# Patient Record
Sex: MALE | Race: WHITE | NOT HISPANIC OR LATINO | Employment: FULL TIME | URBAN - METROPOLITAN AREA
[De-identification: names, ages, dates, MRNs, and addresses within clinical notes are randomized per-mention and may not be internally consistent; named-entity substitution may affect disease eponyms.]

---

## 2017-10-24 ENCOUNTER — CLINICAL SUPPORT (OUTPATIENT)
Dept: OCCUPATIONAL MEDICINE | Facility: CLINIC | Age: 42
End: 2017-10-24

## 2017-10-24 DIAGNOSIS — Z00.00 PHYSICAL EXAM: Primary | ICD-10-CM

## 2017-10-24 LAB
BILIRUB UR QL STRIP: NORMAL
GLUCOSE UR QL STRIP: NORMAL
KETONES UR QL STRIP: NORMAL
LEUKOCYTE ESTERASE UR QL STRIP: NORMAL
PH, POC UA: NORMAL (ref 5–8)
POC BLOOD, URINE: NORMAL
POC NITRATES, URINE: NORMAL
PROT UR QL STRIP: NORMAL
SP GR UR STRIP: NORMAL (ref 1–1.03)
UROBILINOGEN UR STRIP-ACNC: NORMAL (ref 0.3–2.2)

## 2017-10-24 PROCEDURE — 97750 PHYSICAL PERFORMANCE TEST: CPT | Mod: S$GLB,,, | Performed by: PREVENTIVE MEDICINE

## 2017-10-24 PROCEDURE — 94010 BREATHING CAPACITY TEST: CPT | Mod: S$GLB,,, | Performed by: PREVENTIVE MEDICINE

## 2017-10-24 PROCEDURE — 99002 DEVICE HANDLING PHYS/QHP: CPT | Mod: S$GLB,,, | Performed by: PREVENTIVE MEDICINE

## 2017-10-24 PROCEDURE — 99499 UNLISTED E&M SERVICE: CPT | Mod: S$GLB,,, | Performed by: PREVENTIVE MEDICINE

## 2017-10-24 PROCEDURE — 89240 UNLISTED MISC PATH TEST: CPT | Mod: S$GLB,,, | Performed by: PREVENTIVE MEDICINE

## 2017-10-24 PROCEDURE — 99080 SPECIAL REPORTS OR FORMS: CPT | Mod: S$GLB,,, | Performed by: PREVENTIVE MEDICINE

## 2017-10-24 PROCEDURE — 95832 PR HAND MUSCLE TEST,MANUAL: CPT | Mod: S$GLB,,, | Performed by: PREVENTIVE MEDICINE

## 2018-05-15 ENCOUNTER — NURSE TRIAGE (OUTPATIENT)
Dept: ADMINISTRATIVE | Facility: CLINIC | Age: 43
End: 2018-05-15

## 2018-05-15 PROBLEM — F17.210 CIGARETTE SMOKER: Status: ACTIVE | Noted: 2018-05-15

## 2018-05-15 PROBLEM — E78.00 HYPERCHOLESTEROLEMIA: Status: ACTIVE | Noted: 2018-05-15

## 2018-05-15 NOTE — TELEPHONE ENCOUNTER
Reason for Disposition   Severe headache, states 'worst headache' of life    Protocols used: ST HEADACHE-A-OH    EC states 3-4 days ago he started with generalized body aches and sweats. Now he has a severe headache that goes to neck. Care advice given.

## 2018-10-26 ENCOUNTER — OCCUPATIONAL HEALTH (OUTPATIENT)
Dept: URGENT CARE | Facility: CLINIC | Age: 43
End: 2018-10-26
Payer: COMMERCIAL

## 2018-10-26 DIAGNOSIS — Z00.00 ENCOUNTER FOR PHYSICAL EXAMINATION: ICD-10-CM

## 2018-10-26 PROCEDURE — 94010 BREATHING CAPACITY TEST: CPT | Mod: S$GLB,,, | Performed by: PHYSICIAN ASSISTANT

## 2018-10-26 PROCEDURE — 99000 SPECIMEN HANDLING OFFICE-LAB: CPT | Mod: S$GLB,,, | Performed by: PHYSICIAN ASSISTANT

## 2018-10-26 PROCEDURE — 99080 SPECIAL REPORTS OR FORMS: CPT | Mod: S$GLB,,, | Performed by: PHYSICIAN ASSISTANT

## 2018-10-26 PROCEDURE — 36415 COLL VENOUS BLD VENIPUNCTURE: CPT | Mod: S$GLB,,, | Performed by: PHYSICIAN ASSISTANT

## 2018-10-26 PROCEDURE — 99002 DEVICE HANDLING PHYS/QHP: CPT | Mod: S$GLB,,, | Performed by: PHYSICIAN ASSISTANT

## 2018-10-26 PROCEDURE — 89240 UNLISTED MISC PATH TEST: CPT | Mod: S$GLB,,, | Performed by: PHYSICIAN ASSISTANT

## 2018-10-27 LAB
ALBUMIN SERPL-MCNC: 4.8 G/DL (ref 3.5–5.5)
ALBUMIN/GLOB SERPL: 1.8 {RATIO} (ref 1.2–2.2)
ALP SERPL-CCNC: 78 IU/L (ref 39–117)
ALT SERPL-CCNC: 18 IU/L (ref 0–44)
APPEARANCE UR: CLEAR
AST SERPL-CCNC: 24 IU/L (ref 0–40)
BASOPHILS # BLD AUTO: 0 X10E3/UL (ref 0–0.2)
BASOPHILS NFR BLD AUTO: 1 %
BILIRUB SERPL-MCNC: 0.3 MG/DL (ref 0–1.2)
BILIRUB UR QL STRIP: NEGATIVE
BUN SERPL-MCNC: 14 MG/DL (ref 6–24)
BUN/CREAT SERPL: 15 (ref 9–20)
CALCIUM SERPL-MCNC: 10 MG/DL (ref 8.7–10.2)
CHLORIDE SERPL-SCNC: 100 MMOL/L (ref 96–106)
CHOLEST SERPL-MCNC: 221 MG/DL (ref 100–199)
CO2 SERPL-SCNC: 22 MMOL/L (ref 20–29)
COLOR UR: YELLOW
CREAT SERPL-MCNC: 0.94 MG/DL (ref 0.76–1.27)
EGFR IF AFRICAN AMERICAN: 115 ML/MIN/1.73
EOSINOPHIL # BLD AUTO: 0.1 X10E3/UL (ref 0–0.4)
EOSINOPHIL NFR BLD AUTO: 1 %
ERYTHROCYTE [DISTWIDTH] IN BLOOD BY AUTOMATED COUNT: 12.7 % (ref 12.3–15.4)
EST. GFR  (NON AFRICAN AMERICAN): 100 ML/MIN/1.73
GGT SERPL-CCNC: 41 IU/L (ref 0–65)
GLOBULIN SER CALC-MCNC: 2.6 G/DL (ref 1.5–4.5)
GLUCOSE SERPL-MCNC: 101 MG/DL (ref 65–99)
GLUCOSE UR QL: NEGATIVE
HCT VFR BLD AUTO: 46.3 % (ref 37.5–51)
HDLC SERPL-MCNC: 62 MG/DL
HGB BLD-MCNC: 16 G/DL (ref 13–17.7)
HGB UR QL STRIP: NEGATIVE
IMM GRANULOCYTES # BLD: 0 X10E3/UL (ref 0–0.1)
IMM GRANULOCYTES NFR BLD: 0 %
KETONES UR QL STRIP: NEGATIVE
LDLC SERPL CALC-MCNC: 143 MG/DL (ref 0–99)
LEUKOCYTE ESTERASE UR QL STRIP: NEGATIVE
LYMPHOCYTES # BLD AUTO: 2 X10E3/UL (ref 0.7–3.1)
LYMPHOCYTES NFR BLD AUTO: 24 %
MCH RBC QN AUTO: 31.3 PG (ref 26.6–33)
MCHC RBC AUTO-ENTMCNC: 34.6 G/DL (ref 31.5–35.7)
MCV RBC AUTO: 90 FL (ref 79–97)
MICRO URNS: NORMAL
MONOCYTES # BLD AUTO: 0.4 X10E3/UL (ref 0.1–0.9)
MONOCYTES NFR BLD AUTO: 5 %
NEUTROPHILS # BLD AUTO: 5.8 X10E3/UL (ref 1.4–7)
NEUTROPHILS NFR BLD AUTO: 69 %
NITRITE UR QL STRIP: NEGATIVE
PH UR STRIP: 6 [PH] (ref 5–7.5)
PLATELET # BLD AUTO: 254 X10E3/UL (ref 150–379)
POTASSIUM SERPL-SCNC: 4.4 MMOL/L (ref 3.5–5.2)
PROT SERPL-MCNC: 7.4 G/DL (ref 6–8.5)
PROT UR QL STRIP: NEGATIVE
RBC # BLD AUTO: 5.12 X10E6/UL (ref 4.14–5.8)
SODIUM SERPL-SCNC: 140 MMOL/L (ref 134–144)
SP GR UR: 1.01 (ref 1–1.03)
TRIGL SERPL-MCNC: 80 MG/DL (ref 0–149)
UROBILINOGEN UR STRIP-MCNC: 0.2 MG/DL (ref 0.2–1)
VLDLC SERPL CALC-MCNC: 16 MG/DL (ref 5–40)
WBC # BLD AUTO: 8.3 X10E3/UL (ref 3.4–10.8)

## 2018-12-10 ENCOUNTER — OFFICE VISIT (OUTPATIENT)
Dept: PRIMARY CARE CLINIC | Facility: CLINIC | Age: 43
End: 2018-12-10
Payer: COMMERCIAL

## 2018-12-10 VITALS
DIASTOLIC BLOOD PRESSURE: 81 MMHG | SYSTOLIC BLOOD PRESSURE: 137 MMHG | RESPIRATION RATE: 18 BRPM | HEIGHT: 66 IN | OXYGEN SATURATION: 95 % | HEART RATE: 60 BPM | TEMPERATURE: 98 F | WEIGHT: 145.19 LBS | BODY MASS INDEX: 23.33 KG/M2

## 2018-12-10 DIAGNOSIS — E78.00 HYPERCHOLESTEROLEMIA: ICD-10-CM

## 2018-12-10 DIAGNOSIS — S60.452A FOREIGN BODY OF RIGHT MIDDLE FINGER: Primary | ICD-10-CM

## 2018-12-10 DIAGNOSIS — F17.210 CIGARETTE SMOKER: ICD-10-CM

## 2018-12-10 DIAGNOSIS — R79.89 ABNORMAL LIVER FUNCTION TESTS: ICD-10-CM

## 2018-12-10 PROCEDURE — 3008F BODY MASS INDEX DOCD: CPT | Mod: CPTII,S$GLB,, | Performed by: FAMILY MEDICINE

## 2018-12-10 PROCEDURE — 90471 IMMUNIZATION ADMIN: CPT | Mod: S$GLB,,, | Performed by: FAMILY MEDICINE

## 2018-12-10 PROCEDURE — 99999 PR PBB SHADOW E&M-EST. PATIENT-LVL III: CPT | Mod: PBBFAC,,, | Performed by: FAMILY MEDICINE

## 2018-12-10 PROCEDURE — 90714 TD VACC NO PRESV 7 YRS+ IM: CPT | Mod: S$GLB,,, | Performed by: FAMILY MEDICINE

## 2018-12-10 PROCEDURE — 99213 OFFICE O/P EST LOW 20 MIN: CPT | Mod: 25,S$GLB,, | Performed by: FAMILY MEDICINE

## 2018-12-10 RX ORDER — HYDROCODONE BITARTRATE AND ACETAMINOPHEN 5; 325 MG/1; MG/1
1 TABLET ORAL EVERY 6 HOURS PRN
Qty: 30 TABLET | Refills: 0 | Status: SHIPPED | OUTPATIENT
Start: 2018-12-10 | End: 2019-01-02

## 2018-12-10 RX ORDER — DOXYCYCLINE 100 MG/1
100 CAPSULE ORAL EVERY 12 HOURS
Qty: 20 CAPSULE | Refills: 0 | Status: SHIPPED | OUTPATIENT
Start: 2018-12-10 | End: 2018-12-20

## 2018-12-10 RX ORDER — IBUPROFEN 600 MG/1
600 TABLET ORAL EVERY 6 HOURS PRN
Qty: 100 TABLET | Refills: 5 | Status: SHIPPED | OUTPATIENT
Start: 2018-12-10 | End: 2018-12-17 | Stop reason: SDUPTHER

## 2018-12-11 ENCOUNTER — OFFICE VISIT (OUTPATIENT)
Dept: SURGERY | Facility: CLINIC | Age: 43
End: 2018-12-11
Payer: COMMERCIAL

## 2018-12-11 VITALS
HEIGHT: 66 IN | HEART RATE: 66 BPM | DIASTOLIC BLOOD PRESSURE: 81 MMHG | WEIGHT: 145.19 LBS | SYSTOLIC BLOOD PRESSURE: 142 MMHG | BODY MASS INDEX: 23.33 KG/M2

## 2018-12-11 DIAGNOSIS — F17.200 SMOKER: Primary | ICD-10-CM

## 2018-12-11 PROCEDURE — 99203 OFFICE O/P NEW LOW 30 MIN: CPT | Mod: S$GLB,,, | Performed by: SURGERY

## 2018-12-11 PROCEDURE — 99999 PR PBB SHADOW E&M-EST. PATIENT-LVL III: CPT | Mod: PBBFAC,,, | Performed by: SURGERY

## 2018-12-11 PROCEDURE — 3008F BODY MASS INDEX DOCD: CPT | Mod: CPTII,S$GLB,, | Performed by: SURGERY

## 2018-12-11 NOTE — PROGRESS NOTES
Patient verified by name and . Allerigies reviewed. Td vaccine given im to left deltoid using aseptic technique. Patient tolerated well

## 2018-12-11 NOTE — LETTER
December 11, 2018      Eddie San MD  [address] [place] [address]           [facility]  [address], [facility] [address]  [place] [address]  Phone: [phone]  Fax: [phone]          Patient: Huber Burciaga   MR Number: [id number]   YOB: 1975   Date of Visit: 12/11/2018       Dear Dr. Eddie San:    Thank you for referring Huber Burciaga to me for evaluation. Attached you will find relevant portions of my assessment and plan of care.    If you have questions, please do not hesitate to call me. I look forward to following Huber Burciaga along with you.    Sincerely,    Shahid Romo MD    Enclosure  CC:  No Recipients    If you would like to receive this communication electronically, please contact [email or web address] or [phone] to request more information on [employer] Link access.    For providers and/or their staff who would like to refer a patient to [facility], please contact us through our one-stop-shop provider referral line, [facility], at [phone].    If you feel you have received this communication in error or would no longer like to receive these types of communications, please e-mail [email or web address]

## 2018-12-11 NOTE — PROGRESS NOTES
Subjective:       Patient ID: Huber Burciaga is a 42 y.o. male.    Chief Complaint: infected finger     HPI:  42-year-old white male in for infected right 3rd digit--stuck by a shrimp 2 weeks ago--patient states tried use drawing salve--too painful to trying to grab anything.  Gets shooting pain burning pain and all fingers.  Patient not sure when had last tetanus shot.     ROS:  Skin: no psoriasis, eczema, skin cancer  HEENT: No headache, ocular pain, blurred vision, diplopia, epistaxis, hoarseness change in voice, thyroid trouble  Lung: No pneumonia, asthma, Tb, wheezing, SOB, smoking half pack per day  Heart: No chest pain, ankle edema, palpitations, MI, yogesh murmur, hypertension, + borderline hyperlipidemia history cholesterol 201  Abdomen: No nausea, vomiting, diarrhea, constipation, ulcers, hepatitis, gallbladder disease, melena, hematochezia, hematemesis  : no UTI, renal disease, stones prostate  MS: no fractures, O/A, lupus, rheumatoid, gout-see history of present illness  Neuro: No dizziness, LOC, seizures   No diabetes, no anemia, no anxiety, no depression   , 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, work tugboat, 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     Objective:   Physical Exam:  General: Well nourished, well developed, no acute distress  Skin:  Whole in tip of the finger about a quarter of a cm--did have what appeared to be a small protrusion of clear something--attempted to grab with hemostat--unsuccessful--has some cellulitis in the tip of the finger with moderate swelling  HEENT: Eyes PERRLA, EOM intact, nose patent, throat non-erythematous   NECK: Supple, no bruits, No JVD, no nodes  Lungs: Clear, no rales, rhonchi, wheezing  Heart: Regular rate and rhythm, no murmurs, gallops, or rubs  Abdomen: flat, bowel sounds positive, no tenderness, or organomegaly  MS: Range of motion and muscle strength intact  Neuro: Alert, CN intact, oriented X 3  Extremities: No cyanosis, clubbing, or edema         Assessment:        1. Foreign body of right middle finger    2. Cigarette smoker    3. Abnormal liver function tests    4. Hypercholesterolemia        Plan:       Foreign body of right middle finger    Cigarette smoker    Abnormal liver function tests    Hypercholesterolemia      appointment Dr. Christine tomorrow at 8:00 a.m. to attempt to remove foreign body from right 3rd digit tip of finger--part of a shrimp spine  Doxycycline 100 mg b.i.d. times 10 days, Neosporin ointment b.i.d, Norco 5 mg 1 p.o. q.d. p.r.n. Pain ibuprofen 600 mg q.6 hours p.r.n. Pain  Tetanus shot--patient not sure when his last tetanus  Did attempt to remove the spine did feel something hard--not sure will show up on x-ray--needs to be deadened and explore

## 2018-12-11 NOTE — PROGRESS NOTES
History & Physical    SUBJECTIVE:     History of Present Illness:  Patient is a 42 y.o. male presents with swelling to right ring finger for last week.  States he initially had a raw shrimp crown go under his fingernail when he was peeling shrimp.  At the time had bleeding but after just felt sore for a few days.   Since then he has had more swelling in the finger and under his fingernail had some drainage  He was seen in Primary care where they attempted to find any residual piece of shrimp  See today in surgery clinic and discussed exploration with patient and he agreed  Chief Complaint   Patient presents with    Finger Injury     Infection, got stabbed by a raw shrimp about 2 weeks ago, gotten bigger and looking more infected.        Review of patient's allergies indicates:  No Known Allergies    Current Outpatient Medications   Medication Sig Dispense Refill    doxycycline (VIBRAMYCIN) 100 MG Cap Take 1 capsule (100 mg total) by mouth every 12 (twelve) hours. for 10 days 20 capsule 0    HYDROcodone-acetaminophen (NORCO) 5-325 mg per tablet Take 1 tablet by mouth every 6 (six) hours as needed. 30 tablet 0    ibuprofen (ADVIL,MOTRIN) 600 MG tablet Take 1 tablet (600 mg total) by mouth every 6 (six) hours as needed. 100 tablet 5     No current facility-administered medications for this visit.        Past Medical History:   Diagnosis Date    Epigastric pain      Past Surgical History:   Procedure Laterality Date    ESOPHAGOGASTRODUODENOSCOPY (EGD) N/A 7/11/2016    Performed by Paul Key MD at [facility] ENDO (4TH FLR)    ULTRASOUND-ENDOSCOPIC-UPPER N/A 7/29/2016    Performed by Matthew Erickson MD at [facility] ENDO     History reviewed. No pertinent family history.  Social History     Tobacco Use    Smoking status: Current Every Day Smoker   Substance Use Topics    Alcohol use: Yes    Drug use: Yes     Comment: opioids        Review of Systems:  Review of Systems   Constitutional: Negative for appetite change,  "fatigue, fever and unexpected weight change.   HENT: Negative for sore throat and trouble swallowing.    Eyes: Negative.    Respiratory: Negative for cough, shortness of breath and wheezing.    Cardiovascular: Negative for chest pain and leg swelling.   Gastrointestinal: Negative for abdominal distention, abdominal pain, blood in stool, constipation, diarrhea, nausea and vomiting.   Endocrine: Negative.    Genitourinary: Negative.    Musculoskeletal: Positive for joint swelling (R 2nd finger, to mid finger). Negative for back pain.   Skin: Negative.  Negative for rash.   Allergic/Immunologic: Negative.    Neurological: Negative.    Hematological: Negative.    Psychiatric/Behavioral: Negative for confusion.       OBJECTIVE:     Vital Signs (Most Recent)  Pulse: 66 (12/11/18 0812)  BP: (!) 142/81 (12/11/18 0812)  5' 6" (1.676 m)  65.9 kg (145 lb 2.8 oz)     Physical Exam:  Physical Exam   Constitutional: He is oriented to person, place, and time. He appears well-developed and well-nourished.   HENT:   Head: Normocephalic and atraumatic.   Eyes: EOM are normal.   Neck: Normal range of motion.   Cardiovascular: Normal rate and normal heart sounds.   Pulmonary/Chest: Effort normal.   Abdominal: Soft. Bowel sounds are normal. He exhibits no distension. There is no tenderness.   Musculoskeletal: Normal range of motion.        Arms:  Neurological: He is alert and oriented to person, place, and time.   Skin: Skin is warm and dry. Capillary refill takes less than 2 seconds.   Psychiatric: He has a normal mood and affect. His behavior is normal.   Nursing note and vitals reviewed.    Lidocaine 6cc was used to do a distal digital block  Exploration was performed with hemostat, adson pickups, and a scalpel but no foreign body found  Fingernail removed as it was loose and not allowing drainage    ASSESSMENT/PLAN:     42yM with soft tissue infection of R 2nd digit    PLAN:Plan     PO abx, RTC 2 weeks       "

## 2018-12-17 ENCOUNTER — OFFICE VISIT (OUTPATIENT)
Dept: PRIMARY CARE CLINIC | Facility: CLINIC | Age: 43
End: 2018-12-17
Payer: COMMERCIAL

## 2018-12-17 VITALS
HEART RATE: 69 BPM | DIASTOLIC BLOOD PRESSURE: 89 MMHG | OXYGEN SATURATION: 97 % | SYSTOLIC BLOOD PRESSURE: 148 MMHG | TEMPERATURE: 98 F | BODY MASS INDEX: 23.2 KG/M2 | HEIGHT: 66 IN | RESPIRATION RATE: 18 BRPM | WEIGHT: 144.38 LBS

## 2018-12-17 DIAGNOSIS — L03.011 CELLULITIS OF RIGHT MIDDLE FINGER: Primary | ICD-10-CM

## 2018-12-17 DIAGNOSIS — F17.210 CIGARETTE SMOKER: ICD-10-CM

## 2018-12-17 DIAGNOSIS — E78.00 HYPERCHOLESTEROLEMIA: ICD-10-CM

## 2018-12-17 PROCEDURE — 3008F BODY MASS INDEX DOCD: CPT | Mod: CPTII,S$GLB,, | Performed by: FAMILY MEDICINE

## 2018-12-17 PROCEDURE — 99213 OFFICE O/P EST LOW 20 MIN: CPT | Mod: S$GLB,,, | Performed by: FAMILY MEDICINE

## 2018-12-17 PROCEDURE — 99999 PR PBB SHADOW E&M-EST. PATIENT-LVL III: CPT | Mod: PBBFAC,,, | Performed by: FAMILY MEDICINE

## 2018-12-17 RX ORDER — IBUPROFEN 600 MG/1
600 TABLET ORAL EVERY 6 HOURS PRN
Qty: 100 TABLET | Refills: 5 | Status: SHIPPED | OUTPATIENT
Start: 2018-12-17 | End: 2020-03-27 | Stop reason: CLARIF

## 2018-12-17 RX ORDER — CLINDAMYCIN HYDROCHLORIDE 300 MG/1
300 CAPSULE ORAL EVERY 8 HOURS
Qty: 30 CAPSULE | Refills: 0 | Status: SHIPPED | OUTPATIENT
Start: 2018-12-17 | End: 2019-01-02

## 2018-12-17 RX ORDER — MUPIROCIN 20 MG/G
OINTMENT TOPICAL 3 TIMES DAILY
Qty: 22 G | Refills: 1 | Status: SHIPPED | OUTPATIENT
Start: 2018-12-17 | End: 2020-03-27 | Stop reason: CLARIF

## 2018-12-17 RX ORDER — OXYCODONE AND ACETAMINOPHEN 5; 325 MG/1; MG/1
1 TABLET ORAL EVERY 4 HOURS PRN
Qty: 30 TABLET | Refills: 0 | Status: SHIPPED | OUTPATIENT
Start: 2018-12-17 | End: 2019-01-02

## 2018-12-17 NOTE — PROGRESS NOTES
Subjective:       Patient ID: Huber Burciaga is a 42 y.o. male.    Chief Complaint: infected finger     HPI:  42-year-old white male in for infected right 3rd digit--stuck by a shrimp head-- pt states finger--was hurting severely yesterday -- Dr Christine removed nail-- no foreign body seen. Pt dug hole in it for second time and worried about whole. Pt states he found ?? Sliver of shrimp head. Pt states does look better today-- but only relief soaking in salt water. 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     ROS:  No significant change  Skin: no psoriasis, eczema, skin cancer--see history of present illness  HEENT: No headache, ocular pain, blurred vision, diplopia, epistaxis, hoarseness change in voice, thyroid trouble  Lung: No pneumonia, asthma, Tb, wheezing, SOB, smoking half pack per day  Heart: No chest pain, ankle edema, palpitations, MI, yogesh murmur, hypertension, + borderline hyperlipidemia history cholesterol 201  Abdomen: No nausea, vomiting, diarrhea, constipation, ulcers, hepatitis, gallbladder disease, melena, hematochezia, hematemesis  : no UTI, renal disease, stones prostate  MS: no fractures, O/A, lupus, rheumatoid, gout-see history of present illness  Neuro: No dizziness, LOC, seizures   No diabetes, no anemia, no anxiety, no depression   , 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 After nail off dug hole again --claims removed sliver of shrimp spine. Worried now hole not closing though nailbed looks good, slight ulcer across tip finger but superficisl Swelling and eryth looks better .   HEENT: Eyes PERRLA, EOM intact, nose patent, throat non-erythematous   NECK: Supple, no bruits, No JVD, no nodes  Lungs: Clear, no rales, rhonchi, wheezing  Heart: Regular rate and rhythm, no  murmurs, gallops, or rubs  Abdomen: flat, bowel sounds positive, no tenderness, or organomegaly  MS: Range of motion and muscle strength intact  Neuro: Alert, CN intact, oriented X 3  Extremities: No cyanosis, clubbing, or edema         Assessment:       1. Cellulitis of right middle finger    2. Cigarette smoker    3. Hypercholesterolemia        Plan:       Cellulitis of right middle finger    Cigarette smoker    Hypercholesterolemia      appointment Dr. Christine--he removed pt's nail--he is out of town few days   Finish Vibramycin, add clindamycin 300 tid x 7 days stop if gets diarrhea   Cont soaks in epson salt Needs not get wet while at work   Percocet 5 mg p.r.n. Pain--ibuprofen 600 q.i.d.  By Bactroban b.i.d.

## 2019-01-02 ENCOUNTER — OFFICE VISIT (OUTPATIENT)
Dept: PRIMARY CARE CLINIC | Facility: CLINIC | Age: 44
End: 2019-01-02
Payer: COMMERCIAL

## 2019-01-02 ENCOUNTER — OFFICE VISIT (OUTPATIENT)
Dept: SURGERY | Facility: CLINIC | Age: 44
End: 2019-01-02
Payer: COMMERCIAL

## 2019-01-02 ENCOUNTER — TELEPHONE (OUTPATIENT)
Dept: SURGERY | Facility: CLINIC | Age: 44
End: 2019-01-02

## 2019-01-02 VITALS
BODY MASS INDEX: 22.34 KG/M2 | DIASTOLIC BLOOD PRESSURE: 94 MMHG | RESPIRATION RATE: 18 BRPM | OXYGEN SATURATION: 99 % | HEIGHT: 66 IN | SYSTOLIC BLOOD PRESSURE: 145 MMHG | WEIGHT: 139 LBS | HEART RATE: 91 BPM

## 2019-01-02 DIAGNOSIS — L03.011 CELLULITIS OF FINGER OF RIGHT HAND: Primary | ICD-10-CM

## 2019-01-02 DIAGNOSIS — L03.011 CELLULITIS OF RIGHT MIDDLE FINGER: Primary | ICD-10-CM

## 2019-01-02 DIAGNOSIS — F17.210 CIGARETTE SMOKER: ICD-10-CM

## 2019-01-02 DIAGNOSIS — E78.00 HYPERCHOLESTEROLEMIA: ICD-10-CM

## 2019-01-02 PROCEDURE — 87070 CULTURE OTHR SPECIMN AEROBIC: CPT

## 2019-01-02 PROCEDURE — 3008F BODY MASS INDEX DOCD: CPT | Mod: CPTII,S$GLB,, | Performed by: FAMILY MEDICINE

## 2019-01-02 PROCEDURE — 99213 PR OFFICE/OUTPT VISIT, EST, LEVL III, 20-29 MIN: ICD-10-PCS | Mod: S$GLB,,, | Performed by: SURGERY

## 2019-01-02 PROCEDURE — 99213 OFFICE O/P EST LOW 20 MIN: CPT | Mod: S$GLB,,, | Performed by: FAMILY MEDICINE

## 2019-01-02 PROCEDURE — 3008F PR BODY MASS INDEX (BMI) DOCUMENTED: ICD-10-PCS | Mod: CPTII,S$GLB,, | Performed by: FAMILY MEDICINE

## 2019-01-02 PROCEDURE — 87075 CULTR BACTERIA EXCEPT BLOOD: CPT

## 2019-01-02 PROCEDURE — 99999 PR PBB SHADOW E&M-EST. PATIENT-LVL III: ICD-10-PCS | Mod: PBBFAC,,, | Performed by: FAMILY MEDICINE

## 2019-01-02 PROCEDURE — 99999 PR PBB SHADOW E&M-EST. PATIENT-LVL II: ICD-10-PCS | Mod: PBBFAC,,, | Performed by: SURGERY

## 2019-01-02 PROCEDURE — 99213 OFFICE O/P EST LOW 20 MIN: CPT | Mod: S$GLB,,, | Performed by: SURGERY

## 2019-01-02 PROCEDURE — 99999 PR PBB SHADOW E&M-EST. PATIENT-LVL III: CPT | Mod: PBBFAC,,, | Performed by: FAMILY MEDICINE

## 2019-01-02 PROCEDURE — 99213 PR OFFICE/OUTPT VISIT, EST, LEVL III, 20-29 MIN: ICD-10-PCS | Mod: S$GLB,,, | Performed by: FAMILY MEDICINE

## 2019-01-02 PROCEDURE — 99999 PR PBB SHADOW E&M-EST. PATIENT-LVL II: CPT | Mod: PBBFAC,,, | Performed by: SURGERY

## 2019-01-02 NOTE — PROGRESS NOTES
Subjective:       Patient ID: Huber Burciaga is a 43 y.o. male.    Chief Complaint: Wound Check (infected right middle finger)    Patient returns for follow up after fingernail removal 3 weeks ago for shrimp horn under fingernail  He states since removed has been up and down with swelling and only thing that helps is soaking in saltwater  Recommended patient to possibly see a hand surgeon for evaluation  XRay of hand also ordered      Review of Systems   Constitutional: Negative for appetite change, fatigue, fever and unexpected weight change.   HENT: Negative for sore throat and trouble swallowing.    Eyes: Negative.    Respiratory: Negative for cough, shortness of breath and wheezing.    Cardiovascular: Negative for chest pain and leg swelling.   Gastrointestinal: Negative for abdominal distention, abdominal pain, blood in stool, constipation, diarrhea, nausea and vomiting.   Endocrine: Negative.    Genitourinary: Negative.    Musculoskeletal: Positive for joint swelling (distal middle phalanx right hand, mild erythema). Negative for back pain.        States soaks for hours and swelling goes down, gets worse and when he soaks again gets better   Skin: Negative.  Negative for rash.   Allergic/Immunologic: Negative.    Neurological: Negative.    Hematological: Negative.    Psychiatric/Behavioral: Negative for confusion.       Objective:      Physical Exam   Constitutional: He is oriented to person, place, and time. He appears well-developed and well-nourished.   HENT:   Head: Normocephalic and atraumatic.   Eyes: EOM are normal.   Neck: Normal range of motion.   Cardiovascular: Normal rate and normal heart sounds.   Pulmonary/Chest: Effort normal.   Abdominal: Soft. Bowel sounds are normal. He exhibits no distension. There is no tenderness.   Musculoskeletal: Normal range of motion.        Arms:  Neurological: He is alert and oriented to person, place, and time.   Skin: Skin is warm and dry. Capillary refill  takes less than 2 seconds.   Psychiatric: He has a normal mood and affect. His behavior is normal.   Nursing note and vitals reviewed.      Assessment:     R middle finger infection, cellulitis  No diagnosis found.    Plan:       Refer to hand surgeon  Culture sent  Encourage soaking, keeping clean

## 2019-01-02 NOTE — PROGRESS NOTES
Subjective:       Patient ID: Huber Burciaga is a 43 y.o. male.    Chief Complaint: Follow-up (finger )     HPI:  43-year-old history of spine  spine and finger--took Vibramycin with no relief later clindamycin but only able to tolerate about 5 days worth of the antibiotic--Leida Emmanuel --fingernail was removed--patient found this rib spine and removed it. Twice pt has dug in finger with needle. Now states finger started bleeding Sunday--swole up real bad Sun. Soaked thumb it kept draining and now about 1/2 size was. Pt worried infection now ?? To the bone. No xray one yet .  No movement in the tip of the finger from the PIP distal--some dried blood in the nail bed      Office visit 12/17 18-- 42-year-old white male in for infected right 3rd digit--stuck by a shrimp head-- pt states finger--was hurting severely yesterday -- Dr Christine removed nail-- no foreign body seen. Pt dug hole in it for second time and worried about whole. Pt states he found ?? Sliver of shrimp head. Pt states does look better today-- but only relief soaking in salt water. 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     ROS:  No significant change  Skin: no psoriasis, eczema, skin cancer--see history of present illness  HEENT: No headache, ocular pain, blurred vision, diplopia, epistaxis, hoarseness change in voice, thyroid trouble  Lung: No pneumonia, asthma, Tb, wheezing, SOB, smoking half pack per day  Heart: No chest pain, ankle edema, palpitations, MI, yogesh murmur, hypertension, + borderline hyperlipidemia history cholesterol 201  Abdomen: No nausea, vomiting, diarrhea, constipation, ulcers, hepatitis, gallbladder disease, melena, hematochezia, hematemesis  : no UTI, renal disease, stones prostate  MS: no fractures, O/A, lupus, rheumatoid, gout-see history of present illness  Neuro: No dizziness, LOC, seizures   No diabetes, no anemia, no anxiety, no depression   , 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   HEENT: Eyes PERRLA, EOM intact, nose patent, throat non-erythematous   NECK: Supple, no bruits, No JVD, no nodes  Lungs: Clear, no rales, rhonchi, wheezing  Heart: Regular rate and rhythm, no murmurs, gallops, or rubs  Abdomen: flat, bowel sounds positive, no tenderness, or organomegaly  MS: Range of motion and muscle strength intact  Neuro: Alert, CN intact, oriented X 3  Extremities: No cyanosis, clubbing, or edema         Assessment:       1. Cellulitis of right middle finger    2. Cigarette smoker    3. Hypercholesterolemia        Plan:       Cellulitis of right middle finger    Cigarette smoker    Hypercholesterolemia      appointment Dr. Christine--he removed pt's nail--pt to see him or Dr Lazacno or DR Da Silva for ??admit tx with vancomycin get infectious dz evaluate   If not admitted needs antibiotic   Percocet/ ibuprofen, xray tip finger rule out osteomyelitis

## 2019-01-02 NOTE — TELEPHONE ENCOUNTER
----- Message from Zandra Gramajo sent at 1/2/2019  4:05 PM CST -----  Please call pt for cellulitis of right middle finger

## 2019-01-05 LAB — BACTERIA SPEC AEROBE CULT: NORMAL

## 2019-01-07 LAB — BACTERIA SPEC ANAEROBE CULT: NORMAL

## 2019-01-10 ENCOUNTER — TELEPHONE (OUTPATIENT)
Dept: SURGERY | Facility: CLINIC | Age: 44
End: 2019-01-10

## 2019-01-10 DIAGNOSIS — L03.011 CELLULITIS OF FINGER OF RIGHT HAND: Primary | ICD-10-CM

## 2019-01-10 NOTE — TELEPHONE ENCOUNTER
----- Message from Isabell Corea sent at 1/10/2019 10:17 AM CST -----  Contact: Patient  Type:  Test Results    Who Called:  Huber patient  Name of Test (Lab/Mammo/Etc):  Wound culture  Date of Test:  2 weeks ago  Ordering Provider:  Dr Romo  Where the test was performed:  Office  Best Call Back Number:  [phone]  Additional Information:  Please call him. Infection is not healing. Thanks.

## 2019-06-30 PROBLEM — L03.011 CELLULITIS OF RIGHT MIDDLE FINGER: Status: RESOLVED | Noted: 2018-12-17 | Resolved: 2019-06-30

## 2019-06-30 PROBLEM — E78.5 HYPERLIPIDEMIA: Status: ACTIVE | Noted: 2018-05-15

## 2019-10-30 ENCOUNTER — OCCUPATIONAL HEALTH (OUTPATIENT)
Dept: URGENT CARE | Facility: CLINIC | Age: 44
End: 2019-10-30

## 2019-10-30 DIAGNOSIS — Z00.00 ANNUAL PHYSICAL EXAM: Primary | ICD-10-CM

## 2019-10-30 PROCEDURE — 94010 BREATHING CAPACITY TEST: CPT | Mod: S$GLB,,, | Performed by: NURSE PRACTITIONER

## 2019-10-30 PROCEDURE — 94010 PULMONARY FUNCTION SCREENING (OCC MED PHYSICALS): ICD-10-PCS | Mod: S$GLB,,, | Performed by: NURSE PRACTITIONER

## 2019-10-30 PROCEDURE — 36415 VENIPUNCTURE: ICD-10-PCS | Mod: S$GLB,,, | Performed by: NURSE PRACTITIONER

## 2019-10-30 PROCEDURE — 99000 SPECIMEN HANDLING OFFICE-LAB: CPT | Mod: S$GLB,,, | Performed by: NURSE PRACTITIONER

## 2019-10-30 PROCEDURE — 99080 OSHA QUESTIONNAIRE: ICD-10-PCS | Mod: S$GLB,,, | Performed by: NURSE PRACTITIONER

## 2019-10-30 PROCEDURE — 89240 OOH PANEL 2 (CMP, CBC W/DIFF, UA, MICR): ICD-10-PCS | Mod: S$GLB,,, | Performed by: NURSE PRACTITIONER

## 2019-10-30 PROCEDURE — 99080 SPECIAL REPORTS OR FORMS: CPT | Mod: S$GLB,,, | Performed by: NURSE PRACTITIONER

## 2019-10-30 PROCEDURE — 36415 COLL VENOUS BLD VENIPUNCTURE: CPT | Mod: S$GLB,,, | Performed by: NURSE PRACTITIONER

## 2019-10-30 PROCEDURE — 99002 DEVICE HANDLING PHYS/QHP: CPT | Mod: S$GLB,,, | Performed by: NURSE PRACTITIONER

## 2019-10-30 PROCEDURE — 99002 N95 MASK FIT: ICD-10-PCS | Mod: S$GLB,,, | Performed by: NURSE PRACTITIONER

## 2019-10-30 PROCEDURE — 89240 UNLISTED MISC PATH TEST: CPT | Mod: S$GLB,,, | Performed by: NURSE PRACTITIONER

## 2019-10-30 PROCEDURE — 99000 SPECIMEN HANDLING: ICD-10-PCS | Mod: S$GLB,,, | Performed by: NURSE PRACTITIONER

## 2019-10-31 LAB
ALBUMIN SERPL-MCNC: 4.6 G/DL (ref 3.5–5.5)
ALBUMIN/GLOB SERPL: 2.2 {RATIO} (ref 1.2–2.2)
ALP SERPL-CCNC: 77 IU/L (ref 39–117)
ALT SERPL-CCNC: 36 IU/L (ref 0–44)
APPEARANCE UR: CLEAR
AST SERPL-CCNC: 28 IU/L (ref 0–40)
BASOPHILS # BLD AUTO: 0.1 X10E3/UL (ref 0–0.2)
BASOPHILS NFR BLD AUTO: 1 %
BILIRUB SERPL-MCNC: 0.4 MG/DL (ref 0–1.2)
BILIRUB UR QL STRIP: NEGATIVE
BUN SERPL-MCNC: 12 MG/DL (ref 6–24)
BUN/CREAT SERPL: 13 (ref 9–20)
CALCIUM SERPL-MCNC: 9.8 MG/DL (ref 8.7–10.2)
CHLORIDE SERPL-SCNC: 103 MMOL/L (ref 96–106)
CHOLEST SERPL-MCNC: 229 MG/DL (ref 100–199)
CO2 SERPL-SCNC: 18 MMOL/L (ref 20–29)
COLOR UR: YELLOW
CREAT SERPL-MCNC: 0.9 MG/DL (ref 0.76–1.27)
EOSINOPHIL # BLD AUTO: 0.2 X10E3/UL (ref 0–0.4)
EOSINOPHIL NFR BLD AUTO: 4 %
ERYTHROCYTE [DISTWIDTH] IN BLOOD BY AUTOMATED COUNT: 13.4 % (ref 12.3–15.4)
GGT SERPL-CCNC: 70 IU/L (ref 0–65)
GLOBULIN SER CALC-MCNC: 2.1 G/DL (ref 1.5–4.5)
GLUCOSE SERPL-MCNC: 92 MG/DL (ref 65–99)
GLUCOSE UR QL: ABNORMAL
HCT VFR BLD AUTO: 47.4 % (ref 37.5–51)
HDLC SERPL-MCNC: 56 MG/DL
HGB BLD-MCNC: 16.1 G/DL (ref 13–17.7)
HGB UR QL STRIP: NEGATIVE
IMM GRANULOCYTES # BLD AUTO: 0 X10E3/UL (ref 0–0.1)
IMM GRANULOCYTES NFR BLD AUTO: 0 %
KETONES UR QL STRIP: NEGATIVE
LDLC SERPL CALC-MCNC: 124 MG/DL (ref 0–99)
LEUKOCYTE ESTERASE UR QL STRIP: NEGATIVE
LYMPHOCYTES # BLD AUTO: 2 X10E3/UL (ref 0.7–3.1)
LYMPHOCYTES NFR BLD AUTO: 30 %
MCH RBC QN AUTO: 30.8 PG (ref 26.6–33)
MCHC RBC AUTO-ENTMCNC: 34 G/DL (ref 31.5–35.7)
MCV RBC AUTO: 91 FL (ref 79–97)
MICRO URNS: ABNORMAL
MONOCYTES # BLD AUTO: 0.3 X10E3/UL (ref 0.1–0.9)
MONOCYTES NFR BLD AUTO: 4 %
NEUTROPHILS # BLD AUTO: 4 X10E3/UL (ref 1.4–7)
NEUTROPHILS NFR BLD AUTO: 61 %
NITRITE UR QL STRIP: NEGATIVE
PH UR STRIP: 5.5 [PH] (ref 5–7.5)
PLATELET # BLD AUTO: 233 X10E3/UL (ref 150–450)
POTASSIUM SERPL-SCNC: 4.2 MMOL/L (ref 3.5–5.2)
PROT SERPL-MCNC: 6.7 G/DL (ref 6–8.5)
PROT UR QL STRIP: NEGATIVE
RBC # BLD AUTO: 5.23 X10E6/UL (ref 4.14–5.8)
SODIUM SERPL-SCNC: 141 MMOL/L (ref 134–144)
SP GR UR: 1.02 (ref 1–1.03)
TRIGL SERPL-MCNC: 247 MG/DL (ref 0–149)
UROBILINOGEN UR STRIP-MCNC: 0.2 MG/DL (ref 0.2–1)
VLDLC SERPL CALC-MCNC: 49 MG/DL (ref 5–40)
WBC # BLD AUTO: 6.7 X10E3/UL (ref 3.4–10.8)

## 2020-12-04 ENCOUNTER — OCCUPATIONAL HEALTH (OUTPATIENT)
Dept: URGENT CARE | Facility: CLINIC | Age: 45
End: 2020-12-04

## 2020-12-04 DIAGNOSIS — Z00.00 ANNUAL PHYSICAL EXAM: Primary | ICD-10-CM

## 2020-12-04 PROCEDURE — 89240 UNLISTED MISC PATH TEST: CPT | Mod: S$GLB,,, | Performed by: PHYSICIAN ASSISTANT

## 2020-12-04 PROCEDURE — 99002 MASK FIT: ICD-10-PCS | Mod: S$GLB,,, | Performed by: PHYSICIAN ASSISTANT

## 2020-12-04 PROCEDURE — 36415 COLL VENOUS BLD VENIPUNCTURE: CPT | Mod: S$GLB,,, | Performed by: PHYSICIAN ASSISTANT

## 2020-12-04 PROCEDURE — 99000 SPECIMEN HANDLING OFFICE-LAB: CPT | Mod: S$GLB,,, | Performed by: PHYSICIAN ASSISTANT

## 2020-12-04 PROCEDURE — 36415 VENIPUNCTURE: ICD-10-PCS | Mod: S$GLB,,, | Performed by: PHYSICIAN ASSISTANT

## 2020-12-04 PROCEDURE — 99000 SPECIMEN HANDLING: ICD-10-PCS | Mod: S$GLB,,, | Performed by: PHYSICIAN ASSISTANT

## 2020-12-04 PROCEDURE — 89240 PANEL 2 (CMP. CBC W/DIFF, UA, MICR): ICD-10-PCS | Mod: S$GLB,,, | Performed by: PHYSICIAN ASSISTANT

## 2020-12-04 PROCEDURE — 99080 OSHA QUESTIONNAIRE: ICD-10-PCS | Mod: S$GLB,,, | Performed by: PHYSICIAN ASSISTANT

## 2020-12-04 PROCEDURE — 99080 SPECIAL REPORTS OR FORMS: CPT | Mod: S$GLB,,, | Performed by: PHYSICIAN ASSISTANT

## 2020-12-04 PROCEDURE — 99002 DEVICE HANDLING PHYS/QHP: CPT | Mod: S$GLB,,, | Performed by: PHYSICIAN ASSISTANT

## 2020-12-05 LAB
ALBUMIN SERPL-MCNC: 5 G/DL
ALBUMIN/GLOB SERPL: 2.1 {RATIO} (ref 1.2–2.2)
ALP SERPL-CCNC: 84 IU/L
ALT SERPL-CCNC: 29 IU/L
APPEARANCE UR: CLEAR
AST SERPL-CCNC: 23 IU/L (ref 0–40)
BASOPHILS # BLD AUTO: 0.1 X10E3/UL
BASOPHILS NFR BLD AUTO: 1 %
BILIRUB SERPL-MCNC: 0.4 MG/DL (ref 0–1.2)
BILIRUB UR QL STRIP: NEGATIVE
BUN SERPL-MCNC: 19 MG/DL
BUN/CREAT SERPL: 21
CALCIUM SERPL-MCNC: 9.9 MG/DL
CHLORIDE SERPL-SCNC: 100 MMOL/L (ref 96–106)
CHOLEST SERPL-MCNC: 306 MG/DL
CO2 SERPL-SCNC: 21 MMOL/L (ref 20–29)
COLOR UR: YELLOW
CREAT SERPL-MCNC: 0.91 MG/DL
EOSINOPHIL # BLD AUTO: 0.2 X10E3/UL
EOSINOPHIL NFR BLD AUTO: 2 %
ERYTHROCYTE [DISTWIDTH] IN BLOOD BY AUTOMATED COUNT: 12 %
GGT SERPL-CCNC: 74 IU/L
GLOBULIN SER CALC-MCNC: 2.4 G/DL (ref 1.5–4.5)
GLUCOSE SERPL-MCNC: 103 MG/DL (ref 65–99)
GLUCOSE UR QL: NEGATIVE
HCT VFR BLD AUTO: 47 %
HDLC SERPL-MCNC: 77 MG/DL
HGB BLD-MCNC: 16.2 G/DL
HGB UR QL STRIP: NEGATIVE
IMM GRANULOCYTES # BLD AUTO: 0 X10E3/UL
IMM GRANULOCYTES NFR BLD AUTO: 0 %
KETONES UR QL STRIP: NEGATIVE
LABORATORY COMMENT REPORT: NORMAL
LDLC SERPL CALC-MCNC: 206 MG/DL
LEUKOCYTE ESTERASE UR QL STRIP: NEGATIVE
LYMPHOCYTES # BLD AUTO: 1.7 X10E3/UL
LYMPHOCYTES NFR BLD AUTO: 22 %
MCH RBC QN AUTO: 31.8 PG
MCHC RBC AUTO-ENTMCNC: 34.5 G/DL
MCV RBC AUTO: 92 FL
MICRO URNS: NORMAL
MONOCYTES # BLD AUTO: 0.4 X10E3/UL
MONOCYTES NFR BLD AUTO: 5 %
NEUTROPHILS # BLD AUTO: 5.6 X10E3/UL
NEUTROPHILS NFR BLD AUTO: 70 %
NITRITE UR QL STRIP: NEGATIVE
PH UR STRIP: 6 [PH] (ref 5–7.5)
PLATELET # BLD AUTO: 288 X10E3/UL (ref 150–450)
POTASSIUM SERPL-SCNC: 5 MMOL/L (ref 3.5–5.2)
PROT SERPL-MCNC: 7.4 G/DL (ref 6–8.5)
PROT UR QL STRIP: NEGATIVE
RBC # BLD AUTO: 5.09 X10E6/UL
SODIUM SERPL-SCNC: 136 MMOL/L (ref 134–144)
SP GR UR: 1.01 (ref 1–1.03)
TRIGL SERPL-MCNC: 128 MG/DL
UROBILINOGEN UR STRIP-MCNC: 0.2 MG/DL (ref 0.2–1)
VLDLC SERPL CALC-MCNC: 23 MG/DL (ref 5–40)
WBC # BLD AUTO: 8 X10E3/UL (ref 3.4–10.8)

## 2021-04-26 ENCOUNTER — PATIENT MESSAGE (OUTPATIENT)
Dept: RESEARCH | Facility: HOSPITAL | Age: 46
End: 2021-04-26

## 2021-05-17 ENCOUNTER — OFFICE VISIT (OUTPATIENT)
Dept: PRIMARY CARE CLINIC | Facility: CLINIC | Age: 46
End: 2021-05-17
Payer: COMMERCIAL

## 2021-05-17 VITALS
DIASTOLIC BLOOD PRESSURE: 82 MMHG | OXYGEN SATURATION: 98 % | WEIGHT: 148.56 LBS | HEART RATE: 70 BPM | BODY MASS INDEX: 23.88 KG/M2 | SYSTOLIC BLOOD PRESSURE: 130 MMHG | RESPIRATION RATE: 16 BRPM | HEIGHT: 66 IN

## 2021-05-17 DIAGNOSIS — K62.5 BRIGHT RED BLOOD PER RECTUM: ICD-10-CM

## 2021-05-17 DIAGNOSIS — R10.31 RIGHT LOWER QUADRANT ABDOMINAL PAIN: ICD-10-CM

## 2021-05-17 DIAGNOSIS — R10.9 RIGHT FLANK PAIN: ICD-10-CM

## 2021-05-17 DIAGNOSIS — R10.11 RIGHT UPPER QUADRANT ABDOMINAL PAIN: ICD-10-CM

## 2021-05-17 LAB
BILIRUB SERPL-MCNC: NORMAL MG/DL
BLOOD URINE, POC: NORMAL
CLARITY, POC UA: CLEAR
COLOR, POC UA: YELLOW
GLUCOSE UR QL STRIP: NORMAL
KETONES UR QL STRIP: NORMAL
LEUKOCYTE ESTERASE URINE, POC: NORMAL
NITRITE, POC UA: NORMAL
PH, POC UA: 5
PROTEIN, POC: NORMAL
SPECIFIC GRAVITY, POC UA: 1.01
UROBILINOGEN, POC UA: NORMAL

## 2021-05-17 PROCEDURE — 3008F BODY MASS INDEX DOCD: CPT | Mod: CPTII,S$GLB,, | Performed by: FAMILY MEDICINE

## 2021-05-17 PROCEDURE — 1126F PR PAIN SEVERITY QUANTIFIED, NO PAIN PRESENT: ICD-10-PCS | Mod: S$GLB,,, | Performed by: FAMILY MEDICINE

## 2021-05-17 PROCEDURE — 99999 PR PBB SHADOW E&M-EST. PATIENT-LVL III: CPT | Mod: PBBFAC,,, | Performed by: FAMILY MEDICINE

## 2021-05-17 PROCEDURE — 99214 PR OFFICE/OUTPT VISIT, EST, LEVL IV, 30-39 MIN: ICD-10-PCS | Mod: 25,S$GLB,, | Performed by: FAMILY MEDICINE

## 2021-05-17 PROCEDURE — 99999 PR PBB SHADOW E&M-EST. PATIENT-LVL III: ICD-10-PCS | Mod: PBBFAC,,, | Performed by: FAMILY MEDICINE

## 2021-05-17 PROCEDURE — 3008F PR BODY MASS INDEX (BMI) DOCUMENTED: ICD-10-PCS | Mod: CPTII,S$GLB,, | Performed by: FAMILY MEDICINE

## 2021-05-17 PROCEDURE — 1126F AMNT PAIN NOTED NONE PRSNT: CPT | Mod: S$GLB,,, | Performed by: FAMILY MEDICINE

## 2021-05-17 PROCEDURE — 99214 OFFICE O/P EST MOD 30 MIN: CPT | Mod: 25,S$GLB,, | Performed by: FAMILY MEDICINE

## 2021-05-17 PROCEDURE — 81002 POCT URINE DIPSTICK WITHOUT MICROSCOPE: ICD-10-PCS | Mod: S$GLB,,, | Performed by: FAMILY MEDICINE

## 2021-05-17 PROCEDURE — 81002 URINALYSIS NONAUTO W/O SCOPE: CPT | Mod: S$GLB,,, | Performed by: FAMILY MEDICINE

## 2021-05-17 RX ORDER — ONDANSETRON 4 MG/1
4 TABLET, FILM COATED ORAL EVERY 6 HOURS PRN
Qty: 30 TABLET | Refills: 2 | Status: SHIPPED | OUTPATIENT
Start: 2021-05-17 | End: 2021-05-20

## 2021-05-20 ENCOUNTER — OFFICE VISIT (OUTPATIENT)
Dept: PRIMARY CARE CLINIC | Facility: CLINIC | Age: 46
End: 2021-05-20
Payer: COMMERCIAL

## 2021-05-20 VITALS
HEART RATE: 89 BPM | TEMPERATURE: 98 F | BODY MASS INDEX: 23.43 KG/M2 | DIASTOLIC BLOOD PRESSURE: 86 MMHG | RESPIRATION RATE: 18 BRPM | SYSTOLIC BLOOD PRESSURE: 120 MMHG | WEIGHT: 145.81 LBS | OXYGEN SATURATION: 100 % | HEIGHT: 66 IN

## 2021-05-20 DIAGNOSIS — E78.5 HYPERLIPIDEMIA, UNSPECIFIED HYPERLIPIDEMIA TYPE: Primary | ICD-10-CM

## 2021-05-20 DIAGNOSIS — Z87.891 HISTORY OF TOBACCO ABUSE: ICD-10-CM

## 2021-05-20 DIAGNOSIS — F41.9 ANXIETY: ICD-10-CM

## 2021-05-20 DIAGNOSIS — F32.A DEPRESSION, UNSPECIFIED DEPRESSION TYPE: ICD-10-CM

## 2021-05-20 PROBLEM — F17.210 CIGARETTE SMOKER: Status: RESOLVED | Noted: 2018-05-15 | Resolved: 2021-05-20

## 2021-05-20 PROCEDURE — 3008F BODY MASS INDEX DOCD: CPT | Mod: CPTII,S$GLB,, | Performed by: FAMILY MEDICINE

## 2021-05-20 PROCEDURE — 1126F PR PAIN SEVERITY QUANTIFIED, NO PAIN PRESENT: ICD-10-PCS | Mod: S$GLB,,, | Performed by: FAMILY MEDICINE

## 2021-05-20 PROCEDURE — 99999 PR PBB SHADOW E&M-EST. PATIENT-LVL III: ICD-10-PCS | Mod: PBBFAC,,, | Performed by: FAMILY MEDICINE

## 2021-05-20 PROCEDURE — 1126F AMNT PAIN NOTED NONE PRSNT: CPT | Mod: S$GLB,,, | Performed by: FAMILY MEDICINE

## 2021-05-20 PROCEDURE — 99214 PR OFFICE/OUTPT VISIT, EST, LEVL IV, 30-39 MIN: ICD-10-PCS | Mod: S$GLB,,, | Performed by: FAMILY MEDICINE

## 2021-05-20 PROCEDURE — 3008F PR BODY MASS INDEX (BMI) DOCUMENTED: ICD-10-PCS | Mod: CPTII,S$GLB,, | Performed by: FAMILY MEDICINE

## 2021-05-20 PROCEDURE — 99999 PR PBB SHADOW E&M-EST. PATIENT-LVL III: CPT | Mod: PBBFAC,,, | Performed by: FAMILY MEDICINE

## 2021-05-20 PROCEDURE — 99214 OFFICE O/P EST MOD 30 MIN: CPT | Mod: S$GLB,,, | Performed by: FAMILY MEDICINE

## 2021-05-20 RX ORDER — PAROXETINE HYDROCHLORIDE 20 MG/1
20 TABLET, FILM COATED ORAL EVERY MORNING
Qty: 30 TABLET | Refills: 5 | Status: SHIPPED | OUTPATIENT
Start: 2021-05-20 | End: 2021-06-01 | Stop reason: SDUPTHER

## 2021-05-20 RX ORDER — PAROXETINE HYDROCHLORIDE 20 MG/1
20 TABLET, FILM COATED ORAL EVERY MORNING
Qty: 30 TABLET | Refills: 11 | Status: SHIPPED | OUTPATIENT
Start: 2021-05-20 | End: 2021-05-20

## 2021-05-24 PROBLEM — R91.1 RIGHT LOWER LOBE PULMONARY NODULE: Status: ACTIVE | Noted: 2021-05-24

## 2021-06-01 ENCOUNTER — NURSE TRIAGE (OUTPATIENT)
Dept: ADMINISTRATIVE | Facility: CLINIC | Age: 46
End: 2021-06-01

## 2021-06-01 RX ORDER — PAROXETINE HYDROCHLORIDE 20 MG/1
20 TABLET, FILM COATED ORAL EVERY MORNING
Qty: 30 TABLET | Refills: 5 | Status: SHIPPED | OUTPATIENT
Start: 2021-06-01 | End: 2021-11-03

## 2021-06-10 ENCOUNTER — TELEPHONE (OUTPATIENT)
Dept: PRIMARY CARE CLINIC | Facility: CLINIC | Age: 46
End: 2021-06-10

## 2021-06-29 ENCOUNTER — TELEPHONE (OUTPATIENT)
Dept: PRIMARY CARE CLINIC | Facility: CLINIC | Age: 46
End: 2021-06-29

## 2021-07-06 PROBLEM — Z01.818 PRE-OP TESTING: Status: ACTIVE | Noted: 2021-07-06

## 2021-07-06 PROBLEM — Z12.11 SCREEN FOR COLON CANCER: Status: ACTIVE | Noted: 2021-07-06

## 2021-07-08 ENCOUNTER — PATIENT OUTREACH (OUTPATIENT)
Dept: ADMINISTRATIVE | Facility: HOSPITAL | Age: 46
End: 2021-07-08

## 2021-08-11 ENCOUNTER — OFFICE VISIT (OUTPATIENT)
Dept: PRIMARY CARE CLINIC | Facility: CLINIC | Age: 46
End: 2021-08-11
Payer: COMMERCIAL

## 2021-08-11 ENCOUNTER — TELEPHONE (OUTPATIENT)
Dept: PRIMARY CARE CLINIC | Facility: CLINIC | Age: 46
End: 2021-08-11

## 2021-08-11 VITALS
WEIGHT: 154.63 LBS | HEIGHT: 66 IN | TEMPERATURE: 99 F | RESPIRATION RATE: 18 BRPM | HEART RATE: 80 BPM | BODY MASS INDEX: 24.85 KG/M2 | DIASTOLIC BLOOD PRESSURE: 80 MMHG | SYSTOLIC BLOOD PRESSURE: 126 MMHG | OXYGEN SATURATION: 98 %

## 2021-08-11 DIAGNOSIS — E78.5 HYPERLIPIDEMIA, UNSPECIFIED HYPERLIPIDEMIA TYPE: ICD-10-CM

## 2021-08-11 DIAGNOSIS — Z87.891 HISTORY OF TOBACCO ABUSE: ICD-10-CM

## 2021-08-11 DIAGNOSIS — F41.9 ANXIETY: Primary | ICD-10-CM

## 2021-08-11 DIAGNOSIS — F32.A DEPRESSION, UNSPECIFIED DEPRESSION TYPE: ICD-10-CM

## 2021-08-11 PROCEDURE — 3077F SYST BP >= 140 MM HG: CPT | Mod: CPTII,S$GLB,, | Performed by: FAMILY MEDICINE

## 2021-08-11 PROCEDURE — 99214 OFFICE O/P EST MOD 30 MIN: CPT | Mod: S$GLB,,, | Performed by: FAMILY MEDICINE

## 2021-08-11 PROCEDURE — 3008F PR BODY MASS INDEX (BMI) DOCUMENTED: ICD-10-PCS | Mod: CPTII,S$GLB,, | Performed by: FAMILY MEDICINE

## 2021-08-11 PROCEDURE — 99999 PR PBB SHADOW E&M-EST. PATIENT-LVL III: CPT | Mod: PBBFAC,,, | Performed by: FAMILY MEDICINE

## 2021-08-11 PROCEDURE — 99999 PR PBB SHADOW E&M-EST. PATIENT-LVL III: ICD-10-PCS | Mod: PBBFAC,,, | Performed by: FAMILY MEDICINE

## 2021-08-11 PROCEDURE — 1159F PR MEDICATION LIST DOCUMENTED IN MEDICAL RECORD: ICD-10-PCS | Mod: CPTII,S$GLB,, | Performed by: FAMILY MEDICINE

## 2021-08-11 PROCEDURE — 3008F BODY MASS INDEX DOCD: CPT | Mod: CPTII,S$GLB,, | Performed by: FAMILY MEDICINE

## 2021-08-11 PROCEDURE — 3079F PR MOST RECENT DIASTOLIC BLOOD PRESSURE 80-89 MM HG: ICD-10-PCS | Mod: CPTII,S$GLB,, | Performed by: FAMILY MEDICINE

## 2021-08-11 PROCEDURE — 3077F PR MOST RECENT SYSTOLIC BLOOD PRESSURE >= 140 MM HG: ICD-10-PCS | Mod: CPTII,S$GLB,, | Performed by: FAMILY MEDICINE

## 2021-08-11 PROCEDURE — 1126F AMNT PAIN NOTED NONE PRSNT: CPT | Mod: CPTII,S$GLB,, | Performed by: FAMILY MEDICINE

## 2021-08-11 PROCEDURE — 3079F DIAST BP 80-89 MM HG: CPT | Mod: CPTII,S$GLB,, | Performed by: FAMILY MEDICINE

## 2021-08-11 PROCEDURE — 99214 PR OFFICE/OUTPT VISIT, EST, LEVL IV, 30-39 MIN: ICD-10-PCS | Mod: S$GLB,,, | Performed by: FAMILY MEDICINE

## 2021-08-11 PROCEDURE — 1159F MED LIST DOCD IN RCRD: CPT | Mod: CPTII,S$GLB,, | Performed by: FAMILY MEDICINE

## 2021-08-11 PROCEDURE — 1126F PR PAIN SEVERITY QUANTIFIED, NO PAIN PRESENT: ICD-10-PCS | Mod: CPTII,S$GLB,, | Performed by: FAMILY MEDICINE

## 2021-08-11 RX ORDER — LORAZEPAM 1 MG/1
TABLET ORAL
Qty: 40 TABLET | Refills: 2 | Status: SHIPPED | OUTPATIENT
Start: 2021-08-11 | End: 2021-11-03

## 2021-08-11 RX ORDER — PAROXETINE HYDROCHLORIDE 20 MG/1
20 TABLET, FILM COATED ORAL EVERY MORNING
Qty: 30 TABLET | Refills: 5 | Status: CANCELLED | OUTPATIENT
Start: 2021-08-11 | End: 2022-08-11

## 2021-08-11 RX ORDER — DULOXETIN HYDROCHLORIDE 30 MG/1
30 CAPSULE, DELAYED RELEASE ORAL DAILY
Qty: 30 CAPSULE | Refills: 11 | Status: SHIPPED | OUTPATIENT
Start: 2021-08-11 | End: 2021-11-03

## 2021-08-17 ENCOUNTER — TELEPHONE (OUTPATIENT)
Dept: PSYCHOLOGY | Facility: CLINIC | Age: 46
End: 2021-08-17

## 2021-11-03 ENCOUNTER — OFFICE VISIT (OUTPATIENT)
Dept: PRIMARY CARE CLINIC | Facility: CLINIC | Age: 46
End: 2021-11-03
Payer: COMMERCIAL

## 2021-11-03 VITALS
DIASTOLIC BLOOD PRESSURE: 78 MMHG | HEIGHT: 66 IN | HEART RATE: 72 BPM | SYSTOLIC BLOOD PRESSURE: 134 MMHG | BODY MASS INDEX: 23.95 KG/M2 | WEIGHT: 149.06 LBS | OXYGEN SATURATION: 98 % | RESPIRATION RATE: 18 BRPM

## 2021-11-03 DIAGNOSIS — E78.5 HYPERLIPIDEMIA, UNSPECIFIED HYPERLIPIDEMIA TYPE: ICD-10-CM

## 2021-11-03 DIAGNOSIS — F41.9 ANXIETY: ICD-10-CM

## 2021-11-03 DIAGNOSIS — F32.A DEPRESSION, UNSPECIFIED DEPRESSION TYPE: ICD-10-CM

## 2021-11-03 DIAGNOSIS — Z87.891 HISTORY OF TOBACCO ABUSE: ICD-10-CM

## 2021-11-03 DIAGNOSIS — Z23 FLU VACCINE NEED: Primary | ICD-10-CM

## 2021-11-03 DIAGNOSIS — Z20.2 EXPOSURE TO STD: ICD-10-CM

## 2021-11-03 PROCEDURE — 3075F PR MOST RECENT SYSTOLIC BLOOD PRESS GE 130-139MM HG: ICD-10-PCS | Mod: CPTII,S$GLB,, | Performed by: FAMILY MEDICINE

## 2021-11-03 PROCEDURE — 90471 IMMUNIZATION ADMIN: CPT | Mod: S$GLB,,, | Performed by: FAMILY MEDICINE

## 2021-11-03 PROCEDURE — 99214 OFFICE O/P EST MOD 30 MIN: CPT | Mod: 25,S$GLB,, | Performed by: FAMILY MEDICINE

## 2021-11-03 PROCEDURE — 90686 FLU VACCINE (QUAD) GREATER THAN OR EQUAL TO 3YO PRESERVATIVE FREE IM: ICD-10-PCS | Mod: S$GLB,,, | Performed by: FAMILY MEDICINE

## 2021-11-03 PROCEDURE — 1159F PR MEDICATION LIST DOCUMENTED IN MEDICAL RECORD: ICD-10-PCS | Mod: CPTII,S$GLB,, | Performed by: FAMILY MEDICINE

## 2021-11-03 PROCEDURE — 1159F MED LIST DOCD IN RCRD: CPT | Mod: CPTII,S$GLB,, | Performed by: FAMILY MEDICINE

## 2021-11-03 PROCEDURE — 87491 CHLMYD TRACH DNA AMP PROBE: CPT | Performed by: FAMILY MEDICINE

## 2021-11-03 PROCEDURE — 3078F PR MOST RECENT DIASTOLIC BLOOD PRESSURE < 80 MM HG: ICD-10-PCS | Mod: CPTII,S$GLB,, | Performed by: FAMILY MEDICINE

## 2021-11-03 PROCEDURE — 3008F PR BODY MASS INDEX (BMI) DOCUMENTED: ICD-10-PCS | Mod: CPTII,S$GLB,, | Performed by: FAMILY MEDICINE

## 2021-11-03 PROCEDURE — 90686 IIV4 VACC NO PRSV 0.5 ML IM: CPT | Mod: S$GLB,,, | Performed by: FAMILY MEDICINE

## 2021-11-03 PROCEDURE — 90471 FLU VACCINE (QUAD) GREATER THAN OR EQUAL TO 3YO PRESERVATIVE FREE IM: ICD-10-PCS | Mod: S$GLB,,, | Performed by: FAMILY MEDICINE

## 2021-11-03 PROCEDURE — 99999 PR PBB SHADOW E&M-EST. PATIENT-LVL III: CPT | Mod: PBBFAC,,, | Performed by: FAMILY MEDICINE

## 2021-11-03 PROCEDURE — 87591 N.GONORRHOEAE DNA AMP PROB: CPT | Performed by: FAMILY MEDICINE

## 2021-11-03 PROCEDURE — 99214 PR OFFICE/OUTPT VISIT, EST, LEVL IV, 30-39 MIN: ICD-10-PCS | Mod: 25,S$GLB,, | Performed by: FAMILY MEDICINE

## 2021-11-03 PROCEDURE — 3008F BODY MASS INDEX DOCD: CPT | Mod: CPTII,S$GLB,, | Performed by: FAMILY MEDICINE

## 2021-11-03 PROCEDURE — 3075F SYST BP GE 130 - 139MM HG: CPT | Mod: CPTII,S$GLB,, | Performed by: FAMILY MEDICINE

## 2021-11-03 PROCEDURE — 99999 PR PBB SHADOW E&M-EST. PATIENT-LVL III: ICD-10-PCS | Mod: PBBFAC,,, | Performed by: FAMILY MEDICINE

## 2021-11-03 PROCEDURE — 3078F DIAST BP <80 MM HG: CPT | Mod: CPTII,S$GLB,, | Performed by: FAMILY MEDICINE

## 2021-11-03 RX ORDER — ATORVASTATIN CALCIUM 10 MG/1
10 TABLET, FILM COATED ORAL DAILY
Qty: 30 TABLET | Refills: 5 | Status: SHIPPED | OUTPATIENT
Start: 2021-11-03 | End: 2022-08-22 | Stop reason: SDUPTHER

## 2021-11-03 RX ORDER — PAROXETINE 30 MG/1
30 TABLET, FILM COATED ORAL DAILY
COMMUNITY
Start: 2021-10-06 | End: 2021-11-03 | Stop reason: SDUPTHER

## 2021-11-03 RX ORDER — ATORVASTATIN CALCIUM 10 MG/1
10 TABLET, FILM COATED ORAL DAILY
COMMUNITY
Start: 2021-10-06 | End: 2021-11-03 | Stop reason: SDUPTHER

## 2021-11-03 RX ORDER — PAROXETINE 30 MG/1
30 TABLET, FILM COATED ORAL DAILY
Qty: 30 TABLET | Refills: 5 | Status: SHIPPED | OUTPATIENT
Start: 2021-11-03 | End: 2021-11-03

## 2021-11-03 RX ORDER — PAROXETINE HYDROCHLORIDE 20 MG/1
TABLET, FILM COATED ORAL
Qty: 30 TABLET | Refills: 1 | Status: SHIPPED | OUTPATIENT
Start: 2021-11-03 | End: 2022-01-12 | Stop reason: SDUPTHER

## 2021-11-09 LAB
C TRACH DNA SPEC QL NAA+PROBE: NOT DETECTED
N GONORRHOEA DNA SPEC QL NAA+PROBE: NOT DETECTED

## 2021-12-22 ENCOUNTER — TELEPHONE (OUTPATIENT)
Dept: PRIMARY CARE CLINIC | Facility: CLINIC | Age: 46
End: 2021-12-22
Payer: COMMERCIAL

## 2022-01-06 ENCOUNTER — TELEPHONE (OUTPATIENT)
Dept: PRIMARY CARE CLINIC | Facility: CLINIC | Age: 47
End: 2022-01-06
Payer: COMMERCIAL

## 2022-01-06 NOTE — TELEPHONE ENCOUNTER
----- Message from Viky Kirkland sent at 1/6/2022  1:22 PM CST -----  Contact: self [phone]  Requesting an RX refill or new RX.  Is this a refill or new RX: refill  RX name and strength:paroxetine (PAXIL) 20 MG tablet  Is this a 30 day or 90 day RX: 30  [facility][id number] - [place] - [address] AT [facility] [place] [address]  Phone: [phone] Fax: [phone]  Comments:

## 2022-01-12 RX ORDER — PAROXETINE HYDROCHLORIDE 20 MG/1
TABLET, FILM COATED ORAL
Qty: 30 TABLET | Refills: 5 | Status: SHIPPED | OUTPATIENT
Start: 2022-01-12 | End: 2022-01-13

## 2022-01-12 NOTE — TELEPHONE ENCOUNTER
Care Due:                  Date            Visit Type   Department     Provider  --------------------------------------------------------------------------------                                             [facility]  Last Visit: 11-      None         PRIMARY CARE   Eddie San  Next Visit: None Scheduled  None         None Found                                                            Last  Test          Frequency    Reason                     Performed    Due Date  --------------------------------------------------------------------------------    Lipid Panel.  12 months..  atorvastatin.............  Not Found    Overdue    Powered by [employer] by [employer]. Reference number: [id number].   1/12/2022 10:37:54 AM CST

## 2022-01-12 NOTE — TELEPHONE ENCOUNTER
----- Message from Isabell Nahomy sent at 1/12/2022 10:24 AM CST -----  Contact: Patient, [phone]  Calling to inquire about refill request for Rx paroxetine (PAXIL) 20 MG tablet, he is leaving to go [place] tomorrow for 2 months. Please call him. Thanks.        [id number] - [place] - [address] [facility] [address] [place] [address]  Phone: [phone] Fax: [phone]

## 2022-01-13 DIAGNOSIS — F41.9 ANXIETY: Primary | ICD-10-CM

## 2022-01-13 RX ORDER — PAROXETINE 30 MG/1
30 TABLET, FILM COATED ORAL EVERY MORNING
Qty: 30 TABLET | Refills: 2 | Status: SHIPPED | OUTPATIENT
Start: 2022-01-13 | End: 2022-08-22

## 2022-01-13 NOTE — TELEPHONE ENCOUNTER
No new care gaps identified.  Powered by [employer] by [employer]. Reference number: [id number].   1/13/2022 10:34:23 AM CST

## 2022-03-29 DIAGNOSIS — R11.0 NAUSEA: Primary | ICD-10-CM

## 2022-03-29 RX ORDER — ONDANSETRON HYDROCHLORIDE 8 MG/1
8 TABLET, FILM COATED ORAL EVERY 12 HOURS PRN
Qty: 20 TABLET | Refills: 0 | Status: SHIPPED | OUTPATIENT
Start: 2022-03-29

## 2022-03-29 NOTE — TELEPHONE ENCOUNTER
Care Due:                  Date            Visit Type   Department     Provider  --------------------------------------------------------------------------------                                EP -                              PRIMARY      [facility]  Last Visit: 11-      CARE ([facility])   PRIMARY CARE   Eddie San  Next Visit: None Scheduled  None         None Found                                                            Last  Test          Frequency    Reason                     Performed    Due Date  --------------------------------------------------------------------------------    CMP.........  12 months..  atorvastatin.............  06- 05-    Lipid Panel.  12 months..  atorvastatin.............  12- 11-    Powered by [employer] by [employer]. Reference number: [id number].   3/29/2022 8:28:51 AM CDT

## 2022-03-29 NOTE — TELEPHONE ENCOUNTER
Pt. Called wife called states he has been vomiting all week, asking for zofran to be sent into the pharmacy

## 2022-08-22 ENCOUNTER — TELEPHONE (OUTPATIENT)
Dept: PRIMARY CARE CLINIC | Facility: CLINIC | Age: 47
End: 2022-08-22
Payer: COMMERCIAL

## 2022-08-22 DIAGNOSIS — F41.9 ANXIETY: ICD-10-CM

## 2022-08-22 RX ORDER — PAROXETINE 30 MG/1
30 TABLET, FILM COATED ORAL EVERY MORNING
Qty: 30 TABLET | Refills: 2 | Status: SHIPPED | OUTPATIENT
Start: 2022-08-22 | End: 2022-08-22 | Stop reason: SDUPTHER

## 2022-08-22 RX ORDER — PAROXETINE 30 MG/1
30 TABLET, FILM COATED ORAL EVERY MORNING
Qty: 30 TABLET | Refills: 2 | Status: SHIPPED | OUTPATIENT
Start: 2022-08-22 | End: 2022-10-07 | Stop reason: SDUPTHER

## 2022-08-22 RX ORDER — ATORVASTATIN CALCIUM 10 MG/1
10 TABLET, FILM COATED ORAL DAILY
Qty: 30 TABLET | Refills: 5 | Status: SHIPPED | OUTPATIENT
Start: 2022-08-22 | End: 2023-05-03

## 2022-08-22 NOTE — TELEPHONE ENCOUNTER
----- Message from Sofy Key sent at 8/22/2022 12:25 PM CDT -----  Contact: Pt [phone]  Requesting an RX refill or new RX.  Is this a refill or new RX: refill   RX name and strength (copy/paste from chart):  paroxetine (PAXIL) 30 MG tablet  Is this a 30 day or 90 day RX: 90   Pharmacy name and phone # (copy/paste from chart):  [facility][id number] - [place] - [address] AT [facility] [address]   Phone:  [phone]  Fax:  [phone]        The doctors have asked that we provide their patients with the following 2 reminders -- prescription refills can take up to 72 hours, and a friendly reminder that in the future you can use your [facility] account to request refills: yes

## 2022-08-22 NOTE — TELEPHONE ENCOUNTER
----- Message from Shakila Nixon sent at 8/22/2022  3:11 PM CDT -----  Contact: pt [phone]  2nd Request    Requesting an RX refill or new RX.  Is this a refill or new RX: Refill  RX name and strength: atorvastatin (LIPITOR) 10 MG tablet  Is this a 30 day or 90 day RX: 30 day   Patient advised that in the future they can use their [facility] account to request a refill?:  no  Pharmacy name and phone #:  [facility] #[id number] - [place] - [address] AT [facility] [address]  [address] [place] [address]  Phone: [phone] Fax: [phone]    Requesting an RX refill or new RX.  Is this a refill or new RX: Refillparoxetine (PAXIL) 30 MG tablet  RX name and strength:   Is this a 30 day or 90 day RX: 30 day   Patient advised that in the future they can use their [facility] account to request a refill?:  no  Pharmacy name and phone #:  [facility] #[id number] - [place] - [address] AT [facility] OF [address]  [address] [place] [address]  Phone: [phone] Fax: [phone]    Comments: Patient is OUT of this Rx!!!    Thank You    
No new care gaps identified.  [facility] Embedded Care Gaps. Reference number: [id number]. 8/22/2022   4:10:47 PM CDT  
room air

## 2022-09-06 ENCOUNTER — OCCUPATIONAL HEALTH (OUTPATIENT)
Dept: URGENT CARE | Facility: CLINIC | Age: 47
End: 2022-09-06

## 2022-09-06 DIAGNOSIS — Z00.00 ENCOUNTER FOR PHYSICAL EXAMINATION: Primary | ICD-10-CM

## 2022-09-06 PROCEDURE — 36415 VENIPUNCTURE: ICD-10-PCS | Mod: S$GLB,,, | Performed by: NURSE PRACTITIONER

## 2022-09-06 PROCEDURE — 36415 COLL VENOUS BLD VENIPUNCTURE: CPT | Mod: S$GLB,,, | Performed by: NURSE PRACTITIONER

## 2022-09-06 PROCEDURE — 99000 SPECIMEN HANDLING: ICD-10-PCS | Mod: S$GLB,,, | Performed by: NURSE PRACTITIONER

## 2022-09-06 PROCEDURE — 89240 PANEL 2 (CMP. CBC W/DIFF, UA, MICR): ICD-10-PCS | Mod: S$GLB,,, | Performed by: NURSE PRACTITIONER

## 2022-09-06 PROCEDURE — 89240 UNLISTED MISC PATH TEST: CPT | Mod: S$GLB,,, | Performed by: NURSE PRACTITIONER

## 2022-09-06 PROCEDURE — 94010 PULMONARY FUNCTION SCREENING (OCC MED PHYSICALS): ICD-10-PCS | Mod: S$GLB,,, | Performed by: NURSE PRACTITIONER

## 2022-09-06 PROCEDURE — 94010 BREATHING CAPACITY TEST: CPT | Mod: S$GLB,,, | Performed by: NURSE PRACTITIONER

## 2022-09-06 PROCEDURE — 99002 MASK FIT-QUANTITATIVE: ICD-10-PCS | Mod: S$GLB,,, | Performed by: NURSE PRACTITIONER

## 2022-09-06 PROCEDURE — 99002 DEVICE HANDLING PHYS/QHP: CPT | Mod: S$GLB,,, | Performed by: NURSE PRACTITIONER

## 2022-09-06 PROCEDURE — 99080 OSHA QUESTIONNAIRE: ICD-10-PCS | Mod: S$GLB,,, | Performed by: NURSE PRACTITIONER

## 2022-09-06 PROCEDURE — 99000 SPECIMEN HANDLING OFFICE-LAB: CPT | Mod: S$GLB,,, | Performed by: NURSE PRACTITIONER

## 2022-09-06 PROCEDURE — 99080 SPECIAL REPORTS OR FORMS: CPT | Mod: S$GLB,,, | Performed by: NURSE PRACTITIONER

## 2022-09-07 ENCOUNTER — TELEPHONE (OUTPATIENT)
Dept: URGENT CARE | Facility: CLINIC | Age: 47
End: 2022-09-07
Payer: COMMERCIAL

## 2022-10-07 DIAGNOSIS — F41.9 ANXIETY: ICD-10-CM

## 2022-10-07 NOTE — TELEPHONE ENCOUNTER
----- Message from Jolly S Deuce sent at 10/7/2022 12:41 PM CDT -----  Contact: Pt Mobile [phone]  Requesting an RX refill or new RX.  Is this a refill or new RX: Refill  RX name and strength paroxetine (PAXIL) 30 MG tablet  Is this a 30 day or 90 day RX: 30  Pharmacy name and phone #   [facility] #[id number] - [place] - [address] AT [facility] [address]  [address] [place] [address]  Phone: [phone] Fax: [phone]  The doctors have asked that we provide their patients with the following 2 reminders -- prescription refills can take up to 72 hours, and a friendly reminder that in the future you can use your [facility] account to request refills:

## 2022-10-07 NOTE — TELEPHONE ENCOUNTER
No new care gaps identified.  [facility] Embedded Care Gaps. Reference number: [id number]. 10/07/2022   1:10:50 PM CDT

## 2022-10-08 RX ORDER — PAROXETINE 30 MG/1
30 TABLET, FILM COATED ORAL EVERY MORNING
Qty: 90 TABLET | Refills: 3 | Status: SHIPPED | OUTPATIENT
Start: 2022-10-08 | End: 2023-05-03

## 2023-05-03 ENCOUNTER — OFFICE VISIT (OUTPATIENT)
Dept: PRIMARY CARE CLINIC | Facility: CLINIC | Age: 48
End: 2023-05-03
Payer: COMMERCIAL

## 2023-05-03 VITALS
HEIGHT: 66 IN | BODY MASS INDEX: 25.56 KG/M2 | TEMPERATURE: 98 F | DIASTOLIC BLOOD PRESSURE: 98 MMHG | SYSTOLIC BLOOD PRESSURE: 144 MMHG | WEIGHT: 159.06 LBS | HEART RATE: 88 BPM | OXYGEN SATURATION: 96 % | RESPIRATION RATE: 18 BRPM

## 2023-05-03 DIAGNOSIS — F41.9 ANXIETY: Primary | ICD-10-CM

## 2023-05-03 DIAGNOSIS — L08.9 INFECTED FINGER: ICD-10-CM

## 2023-05-03 DIAGNOSIS — F32.0 CURRENT MILD EPISODE OF MAJOR DEPRESSIVE DISORDER WITHOUT PRIOR EPISODE: ICD-10-CM

## 2023-05-03 DIAGNOSIS — Z87.891 HISTORY OF TOBACCO ABUSE: ICD-10-CM

## 2023-05-03 DIAGNOSIS — E78.00 PURE HYPERCHOLESTEROLEMIA: ICD-10-CM

## 2023-05-03 PROCEDURE — 3080F PR MOST RECENT DIASTOLIC BLOOD PRESSURE >= 90 MM HG: ICD-10-PCS | Mod: CPTII,S$GLB,, | Performed by: FAMILY MEDICINE

## 2023-05-03 PROCEDURE — 1159F PR MEDICATION LIST DOCUMENTED IN MEDICAL RECORD: ICD-10-PCS | Mod: CPTII,S$GLB,, | Performed by: FAMILY MEDICINE

## 2023-05-03 PROCEDURE — 1159F MED LIST DOCD IN RCRD: CPT | Mod: CPTII,S$GLB,, | Performed by: FAMILY MEDICINE

## 2023-05-03 PROCEDURE — 3008F PR BODY MASS INDEX (BMI) DOCUMENTED: ICD-10-PCS | Mod: CPTII,S$GLB,, | Performed by: FAMILY MEDICINE

## 2023-05-03 PROCEDURE — 99999 PR PBB SHADOW E&M-EST. PATIENT-LVL IV: CPT | Mod: PBBFAC,,, | Performed by: FAMILY MEDICINE

## 2023-05-03 PROCEDURE — 3077F SYST BP >= 140 MM HG: CPT | Mod: CPTII,S$GLB,, | Performed by: FAMILY MEDICINE

## 2023-05-03 PROCEDURE — 99214 OFFICE O/P EST MOD 30 MIN: CPT | Mod: S$GLB,,, | Performed by: FAMILY MEDICINE

## 2023-05-03 PROCEDURE — 3008F BODY MASS INDEX DOCD: CPT | Mod: CPTII,S$GLB,, | Performed by: FAMILY MEDICINE

## 2023-05-03 PROCEDURE — 3077F PR MOST RECENT SYSTOLIC BLOOD PRESSURE >= 140 MM HG: ICD-10-PCS | Mod: CPTII,S$GLB,, | Performed by: FAMILY MEDICINE

## 2023-05-03 PROCEDURE — 3080F DIAST BP >= 90 MM HG: CPT | Mod: CPTII,S$GLB,, | Performed by: FAMILY MEDICINE

## 2023-05-03 PROCEDURE — 99999 PR PBB SHADOW E&M-EST. PATIENT-LVL IV: ICD-10-PCS | Mod: PBBFAC,,, | Performed by: FAMILY MEDICINE

## 2023-05-03 PROCEDURE — 99214 PR OFFICE/OUTPT VISIT, EST, LEVL IV, 30-39 MIN: ICD-10-PCS | Mod: S$GLB,,, | Performed by: FAMILY MEDICINE

## 2023-05-03 RX ORDER — DOXYCYCLINE 100 MG/1
100 CAPSULE ORAL 2 TIMES DAILY
Qty: 20 CAPSULE | Refills: 0 | Status: SHIPPED | OUTPATIENT
Start: 2023-05-03

## 2023-05-03 RX ORDER — PAROXETINE HYDROCHLORIDE 40 MG/1
40 TABLET, FILM COATED ORAL EVERY MORNING
Qty: 30 TABLET | Refills: 11 | Status: SHIPPED | OUTPATIENT
Start: 2023-05-03 | End: 2024-05-02

## 2023-05-03 RX ORDER — DOXYCYCLINE 100 MG/1
100 CAPSULE ORAL 2 TIMES DAILY
Qty: 20 CAPSULE | Refills: 0 | Status: SHIPPED | OUTPATIENT
Start: 2023-05-03 | End: 2023-05-03 | Stop reason: SDUPTHER

## 2023-05-03 RX ORDER — AMLODIPINE BESYLATE 2.5 MG/1
2.5 TABLET ORAL DAILY
Qty: 30 TABLET | Refills: 11 | Status: SHIPPED | OUTPATIENT
Start: 2023-05-03 | End: 2024-05-02

## 2023-05-03 RX ORDER — ATORVASTATIN CALCIUM 40 MG/1
40 TABLET, FILM COATED ORAL DAILY
Qty: 90 TABLET | Refills: 3 | Status: SHIPPED | OUTPATIENT
Start: 2023-05-03 | End: 2024-05-02

## 2023-05-03 NOTE — PROGRESS NOTES
Subjective:       Patient ID: Huber Burciaga is a 47 y.o. male.    Chief Complaint: Medication Refill and finger infection     HPI:  46 yo WM i medication refills--right hand finger 3rd digit--pt had infection right 3 rd digit --2-3 yrs ago--took nail off --had hole inot finger --bone got infected and part bone needed be removed. This infection started 2 weeks ago--after 4-5 days cut skin pus came out.  Drained a few times--wound opened again ?? 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 Spoke to hand surgeon told $1800 nail off no guarantee may need another surgery. Then may need have nailbed ablated    Needs refills --work physical 3 mo ago cholesteral went up 30 points on Lipitor 10  Anxiety stressful job Paxil helps want fong to paxil 40 mg   Hypertension blood pressure 144/98 todaybut pt took workout suppliment usuallu 140/80       ROS:  Skin: no psoriasis, eczema, skin cancer right 3rd digit   HEENT: No headache, ocular pain, blurred vision, diplopia, epistaxis, hoarseness change in voice, thyroid trouble  Lung: No pneumonia, asthma, Tb, wheezing, SOB,smoking rare social   Heart: No chest pain, ankle edema, palpitations, MI, yogesh murmur, +hypertension, +hyperlipidemia no stent bypass  Abdomen: No nausea, vomiting, diarrhea, constipation, ulcers, hepatitis, gallbladder disease, melena, hematochezia, hematemesis-history increased liver function  : no UTI, renal disease, stones--prostate or venereal disease  MS: no fractures, O/A, lupus, rheumatoid, gout  Neuro: No dizziness, LOC, seizures   No diabetes, no anemia, no anxiety, no depression    x 2 months 2 children work [employer]--alternates daytime nighttime shifts -lives alone     Objective:   Physical Exam:  General: Well nourished, well developed, no acute distress +thin  Skin:  Infection of the distal finger D IP joint distally--nail cracked inhaler--some irregularity to the nail bed--patient  however has had surgery on the finger before due to osteomyelitis and infected finger  HEENT: Eyes PERRLA, EOM intact, nose patent, throat non-erythematous   NECK: Supple, no bruits, No JVD, no nodes  Lungs: Clear, no rales, rhonchi, wheezing  Heart: Regular rate and rhythm, no murmurs, gallops, or rubs  Abdomen:  Flat bowel sounds positive no tenderness organomegaly rebound guarding  MS: Range of motion and muscle strength intact  Neuro: Alert, CN intact, oriented X 3  Extremities: No cyanosis, clubbing, or edema         Assessment:       1. Anxiety    2. Infected finger    3. Current mild episode of major depressive disorder without prior episode    4. Pure hypercholesterolemia    5. History of tobacco abuse          Plan:       Anxiety    Infected finger  -     Ambulatory referral/consult to General Surgery; Future; Expected date: 05/10/2023  -     X-Ray Finger 2 or More Views Right; Future; Expected date: 05/03/2023    Current mild episode of major depressive disorder without prior episode    Pure hypercholesterolemia  -     CBC Auto Differential; Future; Expected date: 05/03/2023  -     Comprehensive Metabolic Panel; Future; Expected date: 05/03/2023  -     Lipid Panel; Future; Expected date: 05/03/2023  -     TSH; Future; Expected date: 05/03/2023    History of tobacco abuse    Other orders  -     atorvastatin (LIPITOR) 40 MG tablet; Take 1 tablet (40 mg total) by mouth once daily.  Dispense: 90 tablet; Refill: 3  -     paroxetine (PAXIL) 40 MG tablet; Take 1 tablet (40 mg total) by mouth every morning.  Dispense: 30 tablet; Refill: 11  -     amLODIPine (NORVASC) 2.5 MG tablet; Take 1 tablet (2.5 mg total) by mouth once daily.  Dispense: 30 tablet; Refill: 11  -     Discontinue: doxycycline (VIBRAMYCIN) 100 MG Cap; Take 1 capsule (100 mg total) by mouth 2 (two) times daily.  Dispense: 20 capsule; Refill: 0  -     doxycycline (VIBRAMYCIN) 100 MG Cap; Take 1 capsule (100 mg total) by mouth 2 (two) times daily.   Dispense: 20 capsule; Refill: 0          Main Reason for Visit -  -anxiety/depression--pt works offrshore --lots stress will increase Paxil to 40 mg from 30 if not doing better may do well with some psychological counseling  Infected right 3rd digit--x-ray finger--history of osteomyelitis with part of the distal tip of the finger being removed--recent infection with pus--needs nail removed--work with [employer]--so will give Vibramycin for possible mycobacterium peridium--see Dr. Romo for nail removal possible cauterization of the nail bed to prevent nail for regrowth  Hyperlipidemia--low-fat diet/exercise as tolerated maintain ideal body weight had Lab recently while on Lipitor 10 cholesterol was still high wants to try Lipitor 40  History tobacco abuse--smokes rail  Hypertension blood pressure 144/98--needs to be on low-sodium diet--Needs arm blood pressure cuff check blood pressure daily Needs blood pressure be 139/89 or less and greater than 90/60--if remains with high systolic and diastolic should start on amlodipine 2.5 will probably need 5 mg  Lab 6 mo CBC CMP,lipid TSH--needs to recheck now to check lipids and liver function  Also scheduled for root canal tomorrow  ??ETOH Abuse

## 2023-08-18 ENCOUNTER — OCCUPATIONAL HEALTH (OUTPATIENT)
Dept: URGENT CARE | Facility: CLINIC | Age: 48
End: 2023-08-18

## 2023-08-18 DIAGNOSIS — Z00.00 ENCOUNTER FOR PHYSICAL EXAMINATION: Primary | ICD-10-CM

## 2023-08-18 DIAGNOSIS — Z13.9 ENCOUNTER FOR SCREENING: Primary | ICD-10-CM

## 2023-08-18 LAB — BREATH ALCOHOL: 0

## 2023-08-18 PROCEDURE — 99002 DEVICE HANDLING PHYS/QHP: CPT | Mod: S$GLB,,, | Performed by: NURSE PRACTITIONER

## 2023-08-18 PROCEDURE — 85025 PR  COMPLETE CBC & AUTO DIFF WBC: ICD-10-PCS | Mod: S$GLB,,, | Performed by: NURSE PRACTITIONER

## 2023-08-18 PROCEDURE — 80305 DRUG TEST PRSMV DIR OPT OBS: CPT | Mod: S$GLB,,, | Performed by: NURSE PRACTITIONER

## 2023-08-18 PROCEDURE — 99080 OSHA QUESTIONNAIRE: ICD-10-PCS | Mod: S$GLB,,, | Performed by: NURSE PRACTITIONER

## 2023-08-18 PROCEDURE — 99499 UNLISTED E&M SERVICE: CPT | Mod: S$GLB,,, | Performed by: NURSE PRACTITIONER

## 2023-08-18 PROCEDURE — 81003 URINALYSIS AUTO W/O SCOPE: CPT | Mod: QW,S$GLB,, | Performed by: NURSE PRACTITIONER

## 2023-08-18 PROCEDURE — 99080 SPECIAL REPORTS OR FORMS: CPT | Mod: S$GLB,,, | Performed by: NURSE PRACTITIONER

## 2023-08-18 PROCEDURE — 80061 LIPID PANEL: ICD-10-PCS | Mod: QW,S$GLB,, | Performed by: NURSE PRACTITIONER

## 2023-08-18 PROCEDURE — 94010 PULMONARY FUNCTION SCREENING (OCC MED PHYSICALS): ICD-10-PCS | Mod: S$GLB,,, | Performed by: NURSE PRACTITIONER

## 2023-08-18 PROCEDURE — 99002 MASK FIT-QUANTITATIVE: ICD-10-PCS | Mod: S$GLB,,, | Performed by: NURSE PRACTITIONER

## 2023-08-18 PROCEDURE — 80053 COMPREHEN METABOLIC PANEL: CPT | Mod: QW,S$GLB,, | Performed by: NURSE PRACTITIONER

## 2023-08-18 PROCEDURE — 82977 ASSAY OF GGT: CPT | Mod: QW,S$GLB,, | Performed by: NURSE PRACTITIONER

## 2023-08-18 PROCEDURE — 80061 LIPID PANEL: CPT | Mod: QW,S$GLB,, | Performed by: NURSE PRACTITIONER

## 2023-08-18 PROCEDURE — 82075 ASSAY OF BREATH ETHANOL: CPT | Mod: S$GLB,,, | Performed by: NURSE PRACTITIONER

## 2023-08-18 PROCEDURE — 94010 BREATHING CAPACITY TEST: CPT | Mod: S$GLB,,, | Performed by: NURSE PRACTITIONER

## 2023-08-18 PROCEDURE — 99499 PHYSICAL - BASIC COMPLEXITY: ICD-10-PCS | Mod: S$GLB,,, | Performed by: NURSE PRACTITIONER

## 2023-08-18 PROCEDURE — 82075 POCT BREATH ALCOHOL TEST: ICD-10-PCS | Mod: S$GLB,,, | Performed by: NURSE PRACTITIONER

## 2023-08-18 PROCEDURE — 81003 URINALYSIS: ICD-10-PCS | Mod: QW,S$GLB,, | Performed by: NURSE PRACTITIONER

## 2023-08-18 PROCEDURE — 82977 GAMMA GT: ICD-10-PCS | Mod: QW,S$GLB,, | Performed by: NURSE PRACTITIONER

## 2023-08-18 PROCEDURE — 80053 COMPREHENSIVE METABOLIC PANEL: ICD-10-PCS | Mod: QW,S$GLB,, | Performed by: NURSE PRACTITIONER

## 2023-08-18 PROCEDURE — 80305 OOH COLLECTION ONLY DRUG SCREEN: ICD-10-PCS | Mod: S$GLB,,, | Performed by: NURSE PRACTITIONER

## 2023-08-18 PROCEDURE — 85025 COMPLETE CBC W/AUTO DIFF WBC: CPT | Mod: S$GLB,,, | Performed by: NURSE PRACTITIONER

## 2023-08-22 ENCOUNTER — TELEPHONE (OUTPATIENT)
Dept: URGENT CARE | Facility: CLINIC | Age: 48
End: 2023-08-22
Payer: COMMERCIAL

## 2023-08-22 NOTE — TELEPHONE ENCOUNTER
Contacted Huber and relayed lab results. Elevated liver enzymes and cholesterol. He is aware of elevated results and has been working with his provider to evaluate further. Cleared from medical exam standpoint, but still recommend further evaluation and/or treatment. All questions answered

## 2023-09-18 ENCOUNTER — PATIENT MESSAGE (OUTPATIENT)
Dept: PRIMARY CARE CLINIC | Facility: CLINIC | Age: 48
End: 2023-09-18
Payer: COMMERCIAL

## 2023-10-18 ENCOUNTER — PATIENT MESSAGE (OUTPATIENT)
Dept: CARDIOLOGY | Facility: CLINIC | Age: 48
End: 2023-10-18
Payer: COMMERCIAL

## 2024-05-03 RX ORDER — PAROXETINE HYDROCHLORIDE 40 MG/1
40 TABLET, FILM COATED ORAL EVERY MORNING
Qty: 90 TABLET | Refills: 0 | Status: SHIPPED | OUTPATIENT
Start: 2024-05-03

## 2024-05-03 NOTE — TELEPHONE ENCOUNTER
Care Due:                  Date            Visit Type   Department     Provider  --------------------------------------------------------------------------------                                SAME DAY -                              ESTABLISHED   [facility]  Last Visit: 05-      PATIENT      PRIMARY CARE   Eddie San  Next Visit: None Scheduled  None         None Found                                                            Last  Test          Frequency    Reason                     Performed    Due Date  --------------------------------------------------------------------------------    Office Visit  15 months..  amLODIPine, atorvastatin,   05- 07-                             paroxetine...............    CMP.........  12 months..  atorvastatin.............  Not Found    Overdue    Lipid Panel.  12 months..  atorvastatin.............  12- 11-    [facility] Embedded Care Due Messages. Reference number: [id number].   5/03/2024 3:29:57 AM CDT

## 2024-05-03 NOTE — TELEPHONE ENCOUNTER
Provider Staff:  Action required for this patient    Requires labs      Please see care gap opportunities below in Care Due Message.    Thanks!  [facility]     Appointments      Date Provider   Last Visit   5/3/2023 Eddie San MD   Next Visit   Visit date not found Eddie San MD     Refill Decision Note   Huber Burciaga  is requesting a refill authorization.  Brief Assessment and Rationale for Refill:  Approve     Medication Therapy Plan:         Comments:     Note composed:9:17 AM 05/03/2024

## 2024-05-15 ENCOUNTER — OCCUPATIONAL HEALTH (OUTPATIENT)
Dept: URGENT CARE | Facility: CLINIC | Age: 49
End: 2024-05-15
Payer: COMMERCIAL

## 2024-05-15 VITALS — HEIGHT: 66 IN | WEIGHT: 156 LBS | BODY MASS INDEX: 25.07 KG/M2

## 2024-05-15 DIAGNOSIS — Z00.00 PHYSICAL EXAM: Primary | ICD-10-CM

## 2024-05-15 PROCEDURE — 99499 UNLISTED E&M SERVICE: CPT | Mod: S$GLB,,,

## 2024-07-22 ENCOUNTER — TELEPHONE (OUTPATIENT)
Dept: PRIMARY CARE CLINIC | Facility: CLINIC | Age: 49
End: 2024-07-22
Payer: COMMERCIAL

## 2024-07-22 NOTE — TELEPHONE ENCOUNTER
----- Message from Jen Pantoja sent at 7/22/2024  1:33 PM CDT -----  Contact: [phone]  Caller is requesting an earlier appointment then we can schedule.  Caller is requesting a message be sent to the provider.    If this is for urgent care symptoms, did you offer other providers at this location, providers at other locations, or [facility]? (yes, no, n/a):  n/a    If this is for the patients physical, did you offer to schedule next available and put on wait list, or to see NP or PA for their physical?  (yes, no, n/a):  n/a    When is the next available appointment with their provider:  08/14/24    Reason for the appointment:  medication refill     Patient preference of timeframe to be scheduled:  08/05, 08/06, 08/07, 08/08     Would the patient like a call back, or a response through their [facility] portal?:   phone    Comments:   Initial (On Arrival)

## 2024-07-22 NOTE — TELEPHONE ENCOUNTER
Called patient to get him scheduled for an annual appointment and medication refill. Patient was scheduled an appointment for the next available. Patient verbalized understanding of appointment date and time.

## 2024-07-29 RX ORDER — PAROXETINE HYDROCHLORIDE 40 MG/1
40 TABLET, FILM COATED ORAL EVERY MORNING
Qty: 90 TABLET | Refills: 0 | Status: SHIPPED | OUTPATIENT
Start: 2024-07-29

## 2024-07-29 NOTE — TELEPHONE ENCOUNTER
Care Due:                  Date            Visit Type   Department     Provider  --------------------------------------------------------------------------------                                SAME DAY -                              ESTABLISHED   [facility]  Last Visit: 05-      PATIENT      PRIMARY CARE   Eddie San                               -                              PRIMARY      [facility]  Next Visit: 08-      CARE ([facility])   PRIMARY CARE   See Carbajal                                                            Last  Test          Frequency    Reason                     Performed    Due Date  --------------------------------------------------------------------------------    CMP.........  12 months..  atorvastatin.............  Not Found    Overdue    Lipid Panel.  12 months..  atorvastatin.............  12- 11-    [facility] Embedded Care Due Messages. Reference number: [id number].   7/29/2024 3:31:59 AM CDT

## 2024-08-02 ENCOUNTER — TELEPHONE (OUTPATIENT)
Dept: PRIMARY CARE CLINIC | Facility: CLINIC | Age: 49
End: 2024-08-02
Payer: COMMERCIAL

## 2024-08-02 DIAGNOSIS — E78.00 PURE HYPERCHOLESTEROLEMIA: Primary | ICD-10-CM

## 2024-08-07 ENCOUNTER — OFFICE VISIT (OUTPATIENT)
Dept: PRIMARY CARE CLINIC | Facility: CLINIC | Age: 49
End: 2024-08-07
Payer: COMMERCIAL

## 2024-08-07 VITALS
SYSTOLIC BLOOD PRESSURE: 138 MMHG | HEIGHT: 66 IN | DIASTOLIC BLOOD PRESSURE: 80 MMHG | BODY MASS INDEX: 25.28 KG/M2 | WEIGHT: 157.31 LBS | OXYGEN SATURATION: 96 % | HEART RATE: 80 BPM | RESPIRATION RATE: 18 BRPM

## 2024-08-07 DIAGNOSIS — R03.0 ELEVATED BLOOD PRESSURE READING: Primary | ICD-10-CM

## 2024-08-07 DIAGNOSIS — F41.9 ANXIETY: ICD-10-CM

## 2024-08-07 DIAGNOSIS — R74.8 ELEVATED LIVER ENZYMES: ICD-10-CM

## 2024-08-07 DIAGNOSIS — Z13.1 DIABETES MELLITUS SCREENING: ICD-10-CM

## 2024-08-07 DIAGNOSIS — E78.2 MIXED HYPERLIPIDEMIA: ICD-10-CM

## 2024-08-07 DIAGNOSIS — F32.0 CURRENT MILD EPISODE OF MAJOR DEPRESSIVE DISORDER WITHOUT PRIOR EPISODE: ICD-10-CM

## 2024-08-07 PROCEDURE — 1159F MED LIST DOCD IN RCRD: CPT | Mod: CPTII,S$GLB,, | Performed by: INTERNAL MEDICINE

## 2024-08-07 PROCEDURE — 3079F DIAST BP 80-89 MM HG: CPT | Mod: CPTII,S$GLB,, | Performed by: INTERNAL MEDICINE

## 2024-08-07 PROCEDURE — 99999 PR PBB SHADOW E&M-EST. PATIENT-LVL IV: CPT | Mod: PBBFAC,,, | Performed by: INTERNAL MEDICINE

## 2024-08-07 PROCEDURE — 1160F RVW MEDS BY RX/DR IN RCRD: CPT | Mod: CPTII,S$GLB,, | Performed by: INTERNAL MEDICINE

## 2024-08-07 PROCEDURE — 3008F BODY MASS INDEX DOCD: CPT | Mod: CPTII,S$GLB,, | Performed by: INTERNAL MEDICINE

## 2024-08-07 PROCEDURE — 3075F SYST BP GE 130 - 139MM HG: CPT | Mod: CPTII,S$GLB,, | Performed by: INTERNAL MEDICINE

## 2024-08-07 PROCEDURE — 99214 OFFICE O/P EST MOD 30 MIN: CPT | Mod: S$GLB,,, | Performed by: INTERNAL MEDICINE

## 2024-08-07 RX ORDER — CLONIDINE HYDROCHLORIDE 0.1 MG/1
TABLET ORAL
Qty: 30 TABLET | Refills: 2 | Status: CANCELLED | OUTPATIENT
Start: 2024-08-07

## 2024-08-07 RX ORDER — PAROXETINE HYDROCHLORIDE 40 MG/1
40 TABLET, FILM COATED ORAL EVERY MORNING
Qty: 90 TABLET | Refills: 3 | Status: SHIPPED | OUTPATIENT
Start: 2024-08-07

## 2024-08-08 ENCOUNTER — TELEPHONE (OUTPATIENT)
Dept: PRIMARY CARE CLINIC | Facility: CLINIC | Age: 49
End: 2024-08-08
Payer: COMMERCIAL

## 2024-08-08 DIAGNOSIS — R03.0 ELEVATED BLOOD PRESSURE READING: Primary | ICD-10-CM

## 2024-08-09 DIAGNOSIS — R03.0 ELEVATED BLOOD PRESSURE READING: ICD-10-CM

## 2024-08-09 RX ORDER — CLONIDINE HYDROCHLORIDE 0.1 MG/1
TABLET ORAL
Qty: 60 TABLET | Refills: 1 | Status: SHIPPED | OUTPATIENT
Start: 2024-08-09 | End: 2024-08-09 | Stop reason: SDUPTHER

## 2024-08-09 NOTE — TELEPHONE ENCOUNTER
No care due was identified.  [facility] Embedded Care Due Messages. Reference number: [id number].   8/09/2024 8:10:48 AM CDT

## 2024-08-10 RX ORDER — CLONIDINE HYDROCHLORIDE 0.1 MG/1
TABLET ORAL
Qty: 60 TABLET | Refills: 1 | Status: SHIPPED | OUTPATIENT
Start: 2024-08-10

## 2024-08-10 RX ORDER — LOSARTAN POTASSIUM 25 MG/1
25 TABLET ORAL DAILY
Qty: 90 TABLET | Refills: 3 | Status: SHIPPED | OUTPATIENT
Start: 2024-08-10 | End: 2025-08-10

## 2024-08-10 NOTE — PROGRESS NOTES
Subjective:       Patient ID: Huber Burciaga is a 48 y.o. male.    Chief Complaint: Annual Exam and Medication Refill    Medication Refill  Pertinent negatives include no abdominal pain, arthralgias, chest pain, headaches, numbness or sore throat.     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  Review labs sl elevation of liver enzyme will repeat  Review of Systems   Constitutional:  Negative for unexpected weight change.   HENT:  Negative for sore throat.    Respiratory:  Negative for shortness of breath.    Cardiovascular:  Negative for chest pain and palpitations.   Gastrointestinal:  Negative for abdominal pain.   Musculoskeletal:  Negative for arthralgias.   Allergic/Immunologic: Negative for environmental allergies and food allergies.   Neurological:  Negative for dizziness, numbness and headaches.   Psychiatric/Behavioral:  Positive for dysphoric mood. The patient is not nervous/anxious.        Objective:      Physical Exam  Vitals and nursing note reviewed.   Constitutional:       General: He is not in acute distress.     Appearance: He is well-developed.   HENT:      Head: Normocephalic and atraumatic.      Right Ear: External ear normal.      Left Ear: External ear normal.      Nose: Nose normal.   Eyes:      Extraocular Movements: Extraocular movements intact.      Conjunctiva/sclera: Conjunctivae normal.      Pupils: Pupils are equal, round,  and reactive to light.   Neck:      Thyroid: No thyromegaly.   Cardiovascular:      Rate and Rhythm: Normal rate and regular rhythm.      Heart sounds: Normal heart sounds. No murmur heard.     No friction rub. No gallop.   Pulmonary:      Effort: Pulmonary effort is normal. No respiratory distress.      Breath sounds: Normal breath sounds. No wheezing.   Abdominal:      General: Bowel sounds are normal. There is no distension.      Palpations: Abdomen is soft.      Tenderness: There is no abdominal tenderness.   Musculoskeletal:         General: No tenderness or deformity. Normal range of motion.      Cervical back: Normal range of motion and neck supple.   Lymphadenopathy:      Cervical: No cervical adenopathy.   Skin:     General: Skin is warm and dry.      Findings: No erythema or rash.   Neurological:      Mental Status: He is alert and oriented to person, place, and time.   Psychiatric:         Mood and Affect: Mood normal.         Thought Content: Thought content normal.         Judgment: Judgment normal.         Assessment:       1. Elevated blood pressure reading    2. Diabetes mellitus screening    3. Current mild episode of major depressive disorder without prior episode    4. Anxiety    5. Mixed hyperlipidemia    6. Elevated liver enzymes        Plan:       Elevated blood pressure reading  Comments:  Patient was on Norvasc 2.5 mg but has not been taking will try losartan 25 mg and clonidine 0.1 mg p.r.n. since his blood pressure has been fluctuating  Orders:  -     losartan (COZAAR) 25 MG tablet; Take 1 tablet (25 mg total) by mouth once daily.  Dispense: 90 tablet; Refill: 3    Diabetes mellitus screening  -     Hemoglobin A1C; Future; Expected date: 08/07/2024    Current mild episode of major depressive disorder without prior episode  -     paroxetine (PAXIL) 40 MG tablet; Take 1 tablet (40 mg total) by mouth every morning.  Dispense: 90 tablet; Refill: 3    Anxiety  -     paroxetine (PAXIL) 40 MG  tablet; Take 1 tablet (40 mg total) by mouth every morning.  Dispense: 90 tablet; Refill: 3    Mixed hyperlipidemia  Comments:  Repeat lippid profile    Elevated liver enzymes  Comments:  A repeat liver enzymes  Orders:  -     Comprehensive Metabolic Panel; Future; Expected date: 08/07/2024        Medication List with Changes/Refills   New Medications    CLONIDINE (CATAPRES) 0.1 MG TABLET    1 po q 8 hrs prn systolic BP >160 or diastolic >90    LOSARTAN (COZAAR) 25 MG TABLET    Take 1 tablet (25 mg total) by mouth once daily.   Changed and/or Refilled Medications    Modified Medication Previous Medication    PAROXETINE (PAXIL) 40 MG TABLET paroxetine (PAXIL) 40 MG tablet       Take 1 tablet (40 mg total) by mouth every morning.    TAKE 1 TABLET(40 MG) BY MOUTH EVERY MORNING   Discontinued Medications    AMLODIPINE (NORVASC) 2.5 MG TABLET    Take 1 tablet (2.5 mg total) by mouth once daily.    ATORVASTATIN (LIPITOR) 40 MG TABLET    Take 1 tablet (40 mg total) by mouth once daily.    DOXYCYCLINE (VIBRAMYCIN) 100 MG CAP    Take 1 capsule (100 mg total) by mouth 2 (two) times daily.    ONDANSETRON (ZOFRAN) 8 MG TABLET    Take 1 tablet (8 mg total) by mouth every 12 (twelve) hours as needed for Nausea.

## 2024-10-04 ENCOUNTER — OCCUPATIONAL HEALTH (OUTPATIENT)
Dept: URGENT CARE | Facility: CLINIC | Age: 49
End: 2024-10-04

## 2024-10-04 DIAGNOSIS — Z00.00 ENCOUNTER FOR PHYSICAL EXAMINATION: Primary | ICD-10-CM

## 2025-01-01 ENCOUNTER — ANESTHESIA (OUTPATIENT)
Dept: INTENSIVE CARE | Facility: HOSPITAL | Age: 50
End: 2025-01-01
Payer: COMMERCIAL

## 2025-01-01 ENCOUNTER — ANESTHESIA EVENT (OUTPATIENT)
Dept: INTENSIVE CARE | Facility: HOSPITAL | Age: 50
End: 2025-01-01
Payer: COMMERCIAL

## 2025-01-01 ENCOUNTER — HOSPITAL ENCOUNTER (INPATIENT)
Facility: HOSPITAL | Age: 50
LOS: 2 days | DRG: 308 | End: 2025-07-29
Attending: STUDENT IN AN ORGANIZED HEALTH CARE EDUCATION/TRAINING PROGRAM | Admitting: STUDENT IN AN ORGANIZED HEALTH CARE EDUCATION/TRAINING PROGRAM
Payer: COMMERCIAL

## 2025-01-01 ENCOUNTER — CLINICAL SUPPORT (OUTPATIENT)
Dept: CARDIOLOGY | Facility: HOSPITAL | Age: 50
End: 2025-01-01
Attending: STUDENT IN AN ORGANIZED HEALTH CARE EDUCATION/TRAINING PROGRAM
Payer: COMMERCIAL

## 2025-01-01 VITALS
WEIGHT: 149.25 LBS | TEMPERATURE: 97 F | OXYGEN SATURATION: 46 % | BODY MASS INDEX: 22.62 KG/M2 | HEIGHT: 68 IN | DIASTOLIC BLOOD PRESSURE: 70 MMHG | SYSTOLIC BLOOD PRESSURE: 100 MMHG | HEART RATE: 92 BPM | RESPIRATION RATE: 28 BRPM

## 2025-01-01 DIAGNOSIS — K92.2 GASTROINTESTINAL HEMORRHAGE, UNSPECIFIED GASTROINTESTINAL HEMORRHAGE TYPE: Primary | ICD-10-CM

## 2025-01-01 DIAGNOSIS — R07.9 CHEST PAIN: ICD-10-CM

## 2025-01-01 DIAGNOSIS — I46.9 CARDIAC ARREST: ICD-10-CM

## 2025-01-01 LAB
ABSOLUTE EOSINOPHIL (SMH): 0 K/UL
ABSOLUTE EOSINOPHIL (SMH): 0 K/UL
ABSOLUTE EOSINOPHIL (SMH): 0.04 K/UL
ABSOLUTE EOSINOPHIL (SMH): 0.11 K/UL
ABSOLUTE MONOCYTE (SMH): 0.24 K/UL (ref 0.3–1)
ABSOLUTE MONOCYTE (SMH): 0.41 K/UL (ref 0.3–1)
ABSOLUTE MONOCYTE (SMH): 0.43 K/UL (ref 0.3–1)
ABSOLUTE MONOCYTE (SMH): 0.8 K/UL (ref 0.3–1)
ABSOLUTE NEUTROPHIL COUNT (SMH): 15.2 K/UL (ref 1.8–7.7)
ABSOLUTE NEUTROPHIL COUNT (SMH): 17.9 K/UL (ref 1.8–7.7)
ABSOLUTE NEUTROPHIL COUNT (SMH): 2.7 K/UL (ref 1.8–7.7)
ABSOLUTE NEUTROPHIL COUNT (SMH): 9 K/UL (ref 1.8–7.7)
ALBUMIN SERPL-MCNC: 2.4 G/DL (ref 3.5–5.2)
ALBUMIN SERPL-MCNC: 3.5 G/DL (ref 3.5–5.2)
ALBUMIN SERPL-MCNC: 3.7 G/DL (ref 3.5–5.2)
ALBUMIN SERPL-MCNC: 4 G/DL (ref 3.5–5.2)
ALLENS TEST: ABNORMAL
ALLENS TEST: POSITIVE
ALLENS TEST: POSITIVE
ALP SERPL-CCNC: 123 UNIT/L (ref 55–135)
ALP SERPL-CCNC: 196 UNIT/L (ref 55–135)
ALP SERPL-CCNC: 231 UNIT/L (ref 55–135)
ALP SERPL-CCNC: 91 UNIT/L (ref 55–135)
ALT SERPL-CCNC: 220 UNIT/L (ref 10–44)
ALT SERPL-CCNC: 3175 UNIT/L (ref 10–44)
ALT SERPL-CCNC: 811 UNIT/L (ref 10–44)
ALT SERPL-CCNC: 820 UNIT/L (ref 10–44)
AMPHET UR QL SCN: NEGATIVE
ANION GAP (SMH): 19 MMOL/L (ref 8–16)
ANION GAP (SMH): 21 MMOL/L (ref 8–16)
ANION GAP (SMH): 21 MMOL/L (ref 8–16)
ANION GAP (SMH): 22 MMOL/L (ref 8–16)
ANION GAP (SMH): 23 MMOL/L (ref 8–16)
ANION GAP (SMH): 25 MMOL/L (ref 8–16)
ANION GAP (SMH): 25 MMOL/L (ref 8–16)
ANION GAP (SMH): 27 MMOL/L (ref 8–16)
APICAL FOUR CHAMBER EJECTION FRACTION: 35 %
AST SERPL-CCNC: 367 UNIT/L (ref 10–40)
AST SERPL-CCNC: 681 UNIT/L (ref 10–40)
AST SERPL-CCNC: 8729 UNIT/L (ref 10–40)
AST SERPL-CCNC: ABNORMAL U/L
AV INDEX (PROSTH): 0.54
AV MEAN GRADIENT: 1 MMHG
AV PEAK GRADIENT: 3 MMHG
AV VALVE AREA BY VELOCITY RATIO: 2.1 CM²
AV VALVE AREA: 1.5 CM²
AV VELOCITY RATIO: 0.75
B-OH-BUTYR BLD STRIP-SCNC: 0.3 MMOL/L
BACTERIA #/AREA URNS AUTO: ABNORMAL /HPF
BARBITURATE SCN PRESENT UR: NEGATIVE
BASOPHILS # BLD AUTO: 0.04 K/UL
BASOPHILS # BLD AUTO: 0.07 K/UL
BASOPHILS # BLD AUTO: 0.08 K/UL
BASOPHILS # BLD AUTO: 0.1 K/UL
BASOPHILS NFR BLD AUTO: 0.4 %
BASOPHILS NFR BLD AUTO: 0.5 %
BASOPHILS NFR BLD AUTO: 0.5 %
BASOPHILS NFR BLD AUTO: 0.7 %
BENZODIAZ UR QL SCN: NEGATIVE
BILIRUB SERPL-MCNC: 0.3 MG/DL (ref 0.1–1)
BILIRUB SERPL-MCNC: 0.9 MG/DL (ref 0.1–1)
BILIRUB SERPL-MCNC: 1.5 MG/DL (ref 0.1–1)
BILIRUB SERPL-MCNC: 3.6 MG/DL (ref 0.1–1)
BILIRUB UR QL STRIP.AUTO: NEGATIVE
BNP SERPL-MCNC: 208 PG/ML
BSA FOR ECHO PROCEDURE: 1.8 M2
BUN SERPL-MCNC: 14 MG/DL (ref 6–20)
BUN SERPL-MCNC: 20 MG/DL (ref 6–20)
BUN SERPL-MCNC: 22 MG/DL (ref 6–20)
BUN SERPL-MCNC: 22 MG/DL (ref 6–20)
BUN SERPL-MCNC: 27 MG/DL (ref 6–20)
BUN SERPL-MCNC: 29 MG/DL (ref 6–20)
BUN SERPL-MCNC: 30 MG/DL (ref 6–20)
BUN SERPL-MCNC: 30 MG/DL (ref 6–20)
BUN SERPL-MCNC: 31 MG/DL (ref 6–20)
BUN SERPL-MCNC: 34 MG/DL (ref 6–20)
BUN SERPL-MCNC: 35 MG/DL (ref 6–20)
CALCIUM SERPL-MCNC: 6 MG/DL (ref 8.7–10.5)
CALCIUM SERPL-MCNC: 6.4 MG/DL (ref 8.7–10.5)
CALCIUM SERPL-MCNC: 6.5 MG/DL (ref 8.7–10.5)
CALCIUM SERPL-MCNC: 6.7 MG/DL (ref 8.7–10.5)
CALCIUM SERPL-MCNC: 6.7 MG/DL (ref 8.7–10.5)
CALCIUM SERPL-MCNC: 7.5 MG/DL (ref 8.7–10.5)
CALCIUM SERPL-MCNC: 7.6 MG/DL (ref 8.7–10.5)
CALCIUM SERPL-MCNC: 7.7 MG/DL (ref 8.7–10.5)
CALCIUM SERPL-MCNC: 8 MG/DL (ref 8.7–10.5)
CALCIUM SERPL-MCNC: 8 MG/DL (ref 8.7–10.5)
CALCIUM SERPL-MCNC: 8.1 MG/DL (ref 8.7–10.5)
CANNABINOIDS UR QL SCN: NEGATIVE
CHLORIDE SERPL-SCNC: 100 MMOL/L (ref 95–110)
CHLORIDE SERPL-SCNC: 101 MMOL/L (ref 95–110)
CHLORIDE SERPL-SCNC: 90 MMOL/L (ref 95–110)
CHLORIDE SERPL-SCNC: 92 MMOL/L (ref 95–110)
CHLORIDE SERPL-SCNC: 92 MMOL/L (ref 95–110)
CHLORIDE SERPL-SCNC: 95 MMOL/L (ref 95–110)
CHLORIDE SERPL-SCNC: 96 MMOL/L (ref 95–110)
CK SERPL-CCNC: 2596 U/L (ref 20–200)
CLARITY UR: ABNORMAL
CO2 SERPL-SCNC: 13 MMOL/L (ref 23–29)
CO2 SERPL-SCNC: 15 MMOL/L (ref 23–29)
CO2 SERPL-SCNC: 15 MMOL/L (ref 23–29)
CO2 SERPL-SCNC: 20 MMOL/L (ref 23–29)
CO2 SERPL-SCNC: 21 MMOL/L (ref 23–29)
CO2 SERPL-SCNC: 22 MMOL/L (ref 23–29)
CO2 SERPL-SCNC: 24 MMOL/L (ref 23–29)
CO2 SERPL-SCNC: 26 MMOL/L (ref 23–29)
CO2 SERPL-SCNC: 26 MMOL/L (ref 23–29)
CO2 SERPL-SCNC: 28 MMOL/L (ref 23–29)
CO2 SERPL-SCNC: 29 MMOL/L (ref 23–29)
COCAINE UR QL SCN: NEGATIVE
COLOR UR AUTO: YELLOW
CORRECTED TEMPERATURE (PCO2): 38.8 MMHG
CORRECTED TEMPERATURE (PCO2): 38.9 MMHG
CORRECTED TEMPERATURE (PH): 7.1
CORRECTED TEMPERATURE (PH): 7.29
CORRECTED TEMPERATURE (PO2): 139 MMHG
CORRECTED TEMPERATURE (PO2): 61.6 MMHG
CREAT SERPL-MCNC: 1.4 MG/DL (ref 0.5–1.4)
CREAT SERPL-MCNC: 1.7 MG/DL (ref 0.5–1.4)
CREAT SERPL-MCNC: 2.3 MG/DL (ref 0.5–1.4)
CREAT SERPL-MCNC: 2.3 MG/DL (ref 0.5–1.4)
CREAT SERPL-MCNC: 3.2 MG/DL (ref 0.5–1.4)
CREAT SERPL-MCNC: 3.7 MG/DL (ref 0.5–1.4)
CREAT SERPL-MCNC: 4 MG/DL (ref 0.5–1.4)
CREAT SERPL-MCNC: 4.1 MG/DL (ref 0.5–1.4)
CREAT SERPL-MCNC: 4.4 MG/DL (ref 0.5–1.4)
CREAT SERPL-MCNC: 4.7 MG/DL (ref 0.5–1.4)
CREAT SERPL-MCNC: 5 MG/DL (ref 0.5–1.4)
CREAT UR-MCNC: 137.2 MG/DL (ref 23–375)
CV ECHO LV RWT: 0.26 CM
DELSYS: ABNORMAL
DOP CALC AO PEAK VEL: 0.8 M/S
DOP CALC AO VTI: 10.9 CM
DOP CALC LVOT AREA: 2.8 CM2
DOP CALC LVOT DIAMETER: 1.9 CM
DOP CALC LVOT PEAK VEL: 0.6 M/S
DOP CALC LVOT STROKE VOLUME: 16.7 CM3
DOP CALC MV VTI: 9.3 CM
DOP CALCLVOT PEAK VEL VTI: 5.9 CM
E WAVE DECELERATION TIME: 239 MSEC
E/A RATIO: 0.56
E/E' RATIO: 6 M/S
ECHO LV POSTERIOR WALL: 0.6 CM (ref 0.6–1.1)
ERYTHROCYTE [DISTWIDTH] IN BLOOD BY AUTOMATED COUNT: 12.5 % (ref 11.5–14.5)
ERYTHROCYTE [DISTWIDTH] IN BLOOD BY AUTOMATED COUNT: 13.1 % (ref 11.5–14.5)
ERYTHROCYTE [DISTWIDTH] IN BLOOD BY AUTOMATED COUNT: 13.2 % (ref 11.5–14.5)
ERYTHROCYTE [DISTWIDTH] IN BLOOD BY AUTOMATED COUNT: 13.3 % (ref 11.5–14.5)
ERYTHROCYTE [DISTWIDTH] IN BLOOD BY AUTOMATED COUNT: 13.4 % (ref 11.5–14.5)
ERYTHROCYTE [DISTWIDTH] IN BLOOD BY AUTOMATED COUNT: 13.5 % (ref 11.5–14.5)
ERYTHROCYTE [DISTWIDTH] IN BLOOD BY AUTOMATED COUNT: 13.6 % (ref 11.5–14.5)
ERYTHROCYTE [SEDIMENTATION RATE] IN BLOOD BY WESTERGREN METHOD: 16 MM/H
ERYTHROCYTE [SEDIMENTATION RATE] IN BLOOD BY WESTERGREN METHOD: 26 MM/H
ERYTHROCYTE [SEDIMENTATION RATE] IN BLOOD BY WESTERGREN METHOD: 26 MM/H
ERYTHROCYTE [SEDIMENTATION RATE] IN BLOOD BY WESTERGREN METHOD: 28 MM/H
ETHANOL SERPL-MCNC: 243 MG/DL
FIO2: 100
FIO2: 450
FIO2: 50 %
FIO2: 60
FIO2: 60
FIO2: 65
FIO2: 75
FIO2: 75 %
FRACTIONAL SHORTENING: 8.7 % (ref 28–44)
GFR SERPLBLD CREATININE-BSD FMLA CKD-EPI: 13 ML/MIN/1.73/M2
GFR SERPLBLD CREATININE-BSD FMLA CKD-EPI: 14 ML/MIN/1.73/M2
GFR SERPLBLD CREATININE-BSD FMLA CKD-EPI: 16 ML/MIN/1.73/M2
GFR SERPLBLD CREATININE-BSD FMLA CKD-EPI: 17 ML/MIN/1.73/M2
GFR SERPLBLD CREATININE-BSD FMLA CKD-EPI: 17 ML/MIN/1.73/M2
GFR SERPLBLD CREATININE-BSD FMLA CKD-EPI: 19 ML/MIN/1.73/M2
GFR SERPLBLD CREATININE-BSD FMLA CKD-EPI: 23 ML/MIN/1.73/M2
GFR SERPLBLD CREATININE-BSD FMLA CKD-EPI: 34 ML/MIN/1.73/M2
GFR SERPLBLD CREATININE-BSD FMLA CKD-EPI: 34 ML/MIN/1.73/M2
GFR SERPLBLD CREATININE-BSD FMLA CKD-EPI: 49 ML/MIN/1.73/M2
GFR SERPLBLD CREATININE-BSD FMLA CKD-EPI: >60 ML/MIN/1.73/M2
GGT SERPL-CCNC: 912 U/L (ref 8–55)
GLUCOSE SERPL-MCNC: 100 MG/DL (ref 70–110)
GLUCOSE SERPL-MCNC: 117 MG/DL (ref 70–110)
GLUCOSE SERPL-MCNC: 125 MG/DL (ref 70–110)
GLUCOSE SERPL-MCNC: 151 MG/DL (ref 70–110)
GLUCOSE SERPL-MCNC: 152 MG/DL (ref 70–110)
GLUCOSE SERPL-MCNC: 157 MG/DL (ref 70–110)
GLUCOSE SERPL-MCNC: 164 MG/DL (ref 70–110)
GLUCOSE SERPL-MCNC: 196 MG/DL (ref 70–110)
GLUCOSE SERPL-MCNC: 201 MG/DL (ref 70–110)
GLUCOSE SERPL-MCNC: 218 MG/DL (ref 70–110)
GLUCOSE SERPL-MCNC: 250 MG/DL (ref 70–110)
GLUCOSE SERPL-MCNC: 250 MG/DL (ref 70–110)
GLUCOSE SERPL-MCNC: 251 MG/DL (ref 70–110)
GLUCOSE SERPL-MCNC: 273 MG/DL (ref 70–110)
GLUCOSE SERPL-MCNC: 69 MG/DL (ref 70–110)
GLUCOSE SERPL-MCNC: 75 MG/DL (ref 70–110)
GLUCOSE UR QL STRIP: ABNORMAL
HCO3 UR-SCNC: 10.9 MMOL/L (ref 24–28)
HCO3 UR-SCNC: 12.4 MMOL/L (ref 24–28)
HCO3 UR-SCNC: 13.2 MMOL/L (ref 24–28)
HCO3 UR-SCNC: 16.2 MMOL/L (ref 24–28)
HCO3 UR-SCNC: 18.7 MMOL/L (ref 24–28)
HCO3 UR-SCNC: 20 MMOL/L (ref 24–28)
HCO3 UR-SCNC: 21.1 MMOL/L (ref 24–28)
HCO3 UR-SCNC: 9.8 MMOL/L (ref 24–28)
HCT VFR BLD AUTO: 41 % (ref 40–54)
HCT VFR BLD AUTO: 43.1 % (ref 40–54)
HCT VFR BLD AUTO: 44 % (ref 40–54)
HCT VFR BLD AUTO: 45.3 % (ref 40–54)
HCT VFR BLD AUTO: 49.4 % (ref 40–54)
HCT VFR BLD AUTO: 51.1 % (ref 40–54)
HCT VFR BLD AUTO: 55 % (ref 40–54)
HCT VFR BLD CALC: 40 %PCV (ref 36–54)
HCT VFR BLD CALC: 42 %PCV (ref 36–54)
HCT VFR BLD CALC: 42 %PCV (ref 36–54)
HCT VFR BLD CALC: 53 %PCV (ref 36–54)
HCT VFR BLD CALC: 56 %PCV (ref 36–54)
HCV AB SERPL QL IA: NORMAL
HGB BLD-MCNC: 14.4 GM/DL (ref 14–18)
HGB BLD-MCNC: 14.8 GM/DL (ref 14–18)
HGB BLD-MCNC: 15.5 GM/DL (ref 14–18)
HGB BLD-MCNC: 15.8 GM/DL (ref 14–18)
HGB BLD-MCNC: 17.3 GM/DL (ref 14–18)
HGB BLD-MCNC: 17.4 GM/DL (ref 14–18)
HGB BLD-MCNC: 18.4 GM/DL (ref 14–18)
HGB UR QL STRIP: ABNORMAL
HIV 1+2 AB+HIV1 P24 AG SERPL QL IA: NORMAL
IMM GRANULOCYTES # BLD AUTO: 0.06 K/UL (ref 0–0.04)
IMM GRANULOCYTES # BLD AUTO: 0.12 K/UL (ref 0–0.04)
IMM GRANULOCYTES # BLD AUTO: 0.15 K/UL (ref 0–0.04)
IMM GRANULOCYTES # BLD AUTO: 0.38 K/UL (ref 0–0.04)
IMM GRANULOCYTES NFR BLD AUTO: 0.6 % (ref 0–0.5)
IMM GRANULOCYTES NFR BLD AUTO: 0.6 % (ref 0–0.5)
IMM GRANULOCYTES NFR BLD AUTO: 0.9 % (ref 0–0.5)
IMM GRANULOCYTES NFR BLD AUTO: 3.8 % (ref 0–0.5)
INR PPP: 1.1 (ref 0.8–1.2)
INTERVENTRICULAR SEPTUM: 0.8 CM (ref 0.6–1.1)
IVC DIAMETER: 1.5 CM
KETONES UR QL STRIP: ABNORMAL
LACTATE SERPL-SCNC: 10.8 MMOL/L (ref 0.5–1.9)
LACTATE SERPL-SCNC: 11.3 MMOL/L (ref 0.5–1.9)
LACTATE SERPL-SCNC: 11.8 MMOL/L (ref 0.5–1.9)
LACTATE SERPL-SCNC: 12.1 MMOL/L (ref 0.5–1.9)
LACTATE SERPL-SCNC: 13.2 MMOL/L (ref 0.5–1.9)
LACTATE SERPL-SCNC: 14.2 MMOL/L (ref 0.5–1.9)
LACTATE SERPL-SCNC: 15.7 MMOL/L (ref 0.5–1.9)
LACTATE SERPL-SCNC: 15.8 MMOL/L (ref 0.5–1.9)
LACTATE SERPL-SCNC: 19.5 MMOL/L (ref 0.5–1.9)
LACTATE SERPL-SCNC: 25.4 MMOL/L (ref 0.5–1.9)
LEFT ATRIUM SIZE: 2.2 CM
LEFT INTERNAL DIMENSION IN SYSTOLE: 4.2 CM (ref 2.1–4)
LEFT VENTRICLE DIASTOLIC VOLUME INDEX: 53.89 ML/M2
LEFT VENTRICLE DIASTOLIC VOLUME: 97 ML
LEFT VENTRICLE END DIASTOLIC VOLUME APICAL 4 CHAMBER: 81.7 ML
LEFT VENTRICLE MASS INDEX: 55.2 G/M2
LEFT VENTRICLE SYSTOLIC VOLUME INDEX: 43.9 ML/M2
LEFT VENTRICLE SYSTOLIC VOLUME: 79 ML
LEFT VENTRICULAR INTERNAL DIMENSION IN DIASTOLE: 4.6 CM (ref 3.5–6)
LEFT VENTRICULAR MASS: 99.3 G
LEUKOCYTE ESTERASE UR QL STRIP: NEGATIVE
LIPASE SERPL-CCNC: 1179 U/L (ref 4–60)
LV LATERAL E/E' RATIO: 5.8 M/S
LV SEPTAL E/E' RATIO: 5.8 M/S
LVED V (TEICH): 97.34 ML
LVES V (TEICH): 78.58 ML
LVOT MG: 1 MMHG
LVOT MV: 0.35 CM/S
LYMPHOCYTES # BLD AUTO: 1.19 K/UL (ref 1–4.8)
LYMPHOCYTES # BLD AUTO: 1.45 K/UL (ref 1–4.8)
LYMPHOCYTES # BLD AUTO: 2.32 K/UL (ref 1–4.8)
LYMPHOCYTES # BLD AUTO: 6.43 K/UL (ref 1–4.8)
MAGNESIUM SERPL-MCNC: 2.9 MG/DL (ref 1.6–2.6)
MCH RBC QN AUTO: 30.6 PG (ref 27–31)
MCH RBC QN AUTO: 30.8 PG (ref 27–31)
MCH RBC QN AUTO: 31.6 PG (ref 27–31)
MCH RBC QN AUTO: 32 PG (ref 27–31)
MCH RBC QN AUTO: 32.6 PG (ref 27–31)
MCH RBC QN AUTO: 32.9 PG (ref 27–31)
MCH RBC QN AUTO: 33.2 PG (ref 27–31)
MCHC RBC AUTO-ENTMCNC: 31.8 G/DL (ref 32–36)
MCHC RBC AUTO-ENTMCNC: 33.5 G/DL (ref 32–36)
MCHC RBC AUTO-ENTMCNC: 34.1 G/DL (ref 32–36)
MCHC RBC AUTO-ENTMCNC: 35 G/DL (ref 32–36)
MCHC RBC AUTO-ENTMCNC: 35.9 G/DL (ref 32–36)
MCHC RBC AUTO-ENTMCNC: 36 G/DL (ref 32–36)
MCHC RBC AUTO-ENTMCNC: 36.1 G/DL (ref 32–36)
MCV RBC AUTO: 91 FL (ref 82–98)
MCV RBC AUTO: 92 FL (ref 82–98)
MCV RBC AUTO: 93 FL (ref 82–98)
MCV RBC AUTO: 97 FL (ref 82–98)
MICROSCOPIC COMMENT: ABNORMAL
MIN VOL: 5.5
MIN VOL: 5.6
MODE: ABNORMAL
MV MEAN GRADIENT: 0 MMHG
MV PEAK A VEL: 0.52 M/S
MV PEAK E VEL: 0.29 M/S
MV PEAK GRADIENT: 1 MMHG
MV STENOSIS PRESSURE HALF TIME: 86 MS
MV VALVE AREA BY CONTINUITY EQUATION: 1.8 CM2
MV VALVE AREA P 1/2 METHOD: 2.56 CM2
NITRITE UR QL STRIP: NEGATIVE
NUCLEATED RBC (/100WBC) (SMH): 0 /100 WBC
NUCLEATED RBC (/100WBC) (SMH): 0 /100 WBC
NUCLEATED RBC (/100WBC) (SMH): 1 /100 WBC
NUCLEATED RBC (/100WBC) (SMH): 1 /100 WBC
O2DEVICE: ABNORMAL
O2DEVICE: ABNORMAL
OB PNL STL: POSITIVE
OHS CV CPX PATIENT HEIGHT IN: 68
OHS CV RV/LV RATIO: 0.61 CM
OHS QRS DURATION: 100 MS
OHS QRS DURATION: 84 MS
OHS QRS DURATION: 92 MS
OHS QTC CALCULATION: 434 MS
OHS QTC CALCULATION: 439 MS
OHS QTC CALCULATION: 524 MS
OPIATES UR QL SCN: NEGATIVE
PCO2 BLDA: 100.8 MMHG (ref 35–45)
PCO2 BLDA: 30.9 MMHG (ref 35–45)
PCO2 BLDA: 32.2 MMHG (ref 35–45)
PCO2 BLDA: 34.6 MMHG (ref 35–45)
PCO2 BLDA: 38.1 MMHG (ref 35–45)
PCO2 BLDA: 38.8 MMHG (ref 35–45)
PCO2 BLDA: 38.9 MMHG (ref 35–45)
PCO2 BLDA: 43.9 MMHG (ref 35–45)
PCO2 BLDA: 44 MMHG (ref 35–45)
PCO2 BLDA: 44.8 MMHG (ref 35–45)
PCP UR QL: NEGATIVE
PEEP: 5
PEEP: 8
PH SMN: 6.91 [PH] (ref 7.35–7.45)
PH SMN: 7.06 [PH] (ref 7.35–7.45)
PH SMN: 7.09 [PH] (ref 7.35–7.45)
PH SMN: 7.1 [PH] (ref 7.35–7.45)
PH SMN: 7.11 [PH] (ref 7.35–7.45)
PH SMN: 7.16 [PH] (ref 7.35–7.45)
PH SMN: 7.23 [PH] (ref 7.35–7.45)
PH SMN: 7.29 [PH] (ref 7.35–7.45)
PH SMN: 7.29 [PH] (ref 7.35–7.45)
PH SMN: 7.31 [PH] (ref 7.35–7.45)
PH UR STRIP: 7 [PH]
PHOSPHATE SERPL-MCNC: 6.7 MG/DL (ref 2.7–4.5)
PIP: 27
PIP: 30
PLATELET # BLD AUTO: 109 K/UL (ref 150–450)
PLATELET # BLD AUTO: 121 K/UL (ref 150–450)
PLATELET # BLD AUTO: 129 K/UL (ref 150–450)
PLATELET # BLD AUTO: 158 K/UL (ref 150–450)
PLATELET # BLD AUTO: 222 K/UL (ref 150–450)
PLATELET # BLD AUTO: 240 K/UL (ref 150–450)
PLATELET # BLD AUTO: 247 K/UL (ref 150–450)
PMV BLD AUTO: 10.1 FL (ref 9.2–12.9)
PMV BLD AUTO: 10.1 FL (ref 9.2–12.9)
PMV BLD AUTO: 10.3 FL (ref 9.2–12.9)
PMV BLD AUTO: 9 FL (ref 9.2–12.9)
PMV BLD AUTO: 9.6 FL (ref 9.2–12.9)
PMV BLD AUTO: 9.7 FL (ref 9.2–12.9)
PMV BLD AUTO: 9.8 FL (ref 9.2–12.9)
PO2 BLDA: 135 MMHG (ref 80–100)
PO2 BLDA: 139 MMHG (ref 80–100)
PO2 BLDA: 157 MMHG (ref 80–100)
PO2 BLDA: 338 MMHG (ref 80–100)
PO2 BLDA: 61.6 MMHG (ref 80–100)
PO2 BLDA: 62 MMHG (ref 80–100)
PO2 BLDA: 64 MMHG (ref 80–100)
PO2 BLDA: 94 MMHG (ref 80–100)
PO2 BLDA: 97 MMHG (ref 40–60)
PO2 BLDA: 98 MMHG (ref 80–100)
POC BASE DEFICIT: -17.7 MMOL/L (ref -2–2)
POC BASE DEFICIT: -7.9 MMOL/L (ref -2–2)
POC BE: -10 MMOL/L (ref -2–2)
POC BE: -13 MMOL/L (ref -2–2)
POC BE: -16 MMOL/L (ref -2–2)
POC BE: -17 MMOL/L (ref -2–2)
POC BE: -19 MMOL/L (ref -2–2)
POC BE: -20 MMOL/L (ref -2–2)
POC BE: -6 MMOL/L (ref -2–2)
POC BE: -9 MMOL/L (ref -2–2)
POC HCO3: 12 MMOL/L (ref 24–28)
POC HCO3: 18.7 MMOL/L (ref 24–28)
POC IONIZED CALCIUM: 0.76 MMOL/L (ref 1.06–1.42)
POC IONIZED CALCIUM: 0.77 MMOL/L (ref 1.06–1.42)
POC IONIZED CALCIUM: 0.98 MMOL/L (ref 1.06–1.42)
POC IONIZED CALCIUM: 1.08 MMOL/L (ref 1.06–1.42)
POC IONIZED CALCIUM: 1.82 MMOL/L (ref 1.06–1.42)
POC PCO2 TEMP: 44.6 MMHG
POC PERFORMED BY: ABNORMAL
POC PERFORMED BY: ABNORMAL
POC PH TEMP: 7.23
POC PO2 TEMP: 64 MMHG
POC SATURATED O2: 100 % (ref 95–100)
POC SATURATED O2: 82.8 % (ref 95–100)
POC SATURATED O2: 88 % (ref 95–100)
POC SATURATED O2: 88 % (ref 95–100)
POC SATURATED O2: 89 % (ref 95–100)
POC SATURATED O2: 94 % (ref 95–100)
POC SATURATED O2: 97 % (ref 95–100)
POC SATURATED O2: 98 % (ref 95–100)
POC SATURATED O2: 98 % (ref 95–100)
POC SATURATED O2: 98.9 % (ref 95–100)
POC SET RR: 28
POC TCO2: 11 MMOL/L (ref 23–27)
POC TCO2: 12 MMOL/L (ref 23–27)
POC TCO2: 13 MMOL/L (ref 23–27)
POC TCO2: 15 MMOL/L (ref 23–27)
POC TCO2: 17 MMOL/L (ref 23–27)
POC TCO2: 20 MMOL/L (ref 23–27)
POC TCO2: 22 MMOL/L (ref 23–27)
POC TCO2: 23 MMOL/L (ref 24–29)
POC TEMPERATURE: 37 C
POC TEMPERATURE: 37 C
POC TEMPERATURE: ABNORMAL
POCT GLUCOSE: 124 MG/DL (ref 70–110)
POCT GLUCOSE: 134 MG/DL (ref 70–110)
POCT GLUCOSE: 64 MG/DL (ref 70–110)
POCT GLUCOSE: 70 MG/DL (ref 70–110)
POTASSIUM BLD-SCNC: 3.6 MMOL/L (ref 3.5–5.1)
POTASSIUM BLD-SCNC: 3.7 MMOL/L (ref 3.5–5.1)
POTASSIUM BLD-SCNC: 4.3 MMOL/L (ref 3.5–5.1)
POTASSIUM BLD-SCNC: 5.3 MMOL/L (ref 3.5–5.1)
POTASSIUM BLD-SCNC: 6 MMOL/L (ref 3.5–5.1)
POTASSIUM SERPL-SCNC: 2.8 MMOL/L (ref 3.5–5.1)
POTASSIUM SERPL-SCNC: 3.1 MMOL/L (ref 3.5–5.1)
POTASSIUM SERPL-SCNC: 3.2 MMOL/L (ref 3.5–5.1)
POTASSIUM SERPL-SCNC: 3.5 MMOL/L (ref 3.5–5.1)
POTASSIUM SERPL-SCNC: 3.7 MMOL/L (ref 3.5–5.1)
POTASSIUM SERPL-SCNC: 3.7 MMOL/L (ref 3.5–5.1)
POTASSIUM SERPL-SCNC: 3.8 MMOL/L (ref 3.5–5.1)
POTASSIUM SERPL-SCNC: 4.1 MMOL/L (ref 3.5–5.1)
POTASSIUM SERPL-SCNC: 4.4 MMOL/L (ref 3.5–5.1)
POTASSIUM SERPL-SCNC: 5.4 MMOL/L (ref 3.5–5.1)
POTASSIUM SERPL-SCNC: 6.7 MMOL/L (ref 3.5–5.1)
PROCALCITONIN SERPL-MCNC: 0.45 NG/ML
PROT SERPL-MCNC: 3.5 GM/DL (ref 6–8.4)
PROT SERPL-MCNC: 5.8 GM/DL (ref 6–8.4)
PROT SERPL-MCNC: 6 GM/DL (ref 6–8.4)
PROT SERPL-MCNC: 6.8 GM/DL (ref 6–8.4)
PROT UR QL STRIP: ABNORMAL
PROTHROMBIN TIME: 11.7 SECONDS (ref 9–12.5)
PV MV: 0.47 M/S
PV PEAK GRADIENT: 2 MMHG
PV PEAK VELOCITY: 0.7 M/S
RBC # BLD AUTO: 4.46 M/UL (ref 4.6–6.2)
RBC # BLD AUTO: 4.67 M/UL (ref 4.6–6.2)
RBC # BLD AUTO: 4.71 M/UL (ref 4.6–6.2)
RBC # BLD AUTO: 4.84 M/UL (ref 4.6–6.2)
RBC # BLD AUTO: 5.4 M/UL (ref 4.6–6.2)
RBC # BLD AUTO: 5.5 M/UL (ref 4.6–6.2)
RBC # BLD AUTO: 6.01 M/UL (ref 4.6–6.2)
RBC #/AREA URNS AUTO: 8 /HPF
RELATIVE EOSINOPHIL (SMH): 0 % (ref 0–8)
RELATIVE EOSINOPHIL (SMH): 0 % (ref 0–8)
RELATIVE EOSINOPHIL (SMH): 0.4 % (ref 0–8)
RELATIVE EOSINOPHIL (SMH): 0.6 % (ref 0–8)
RELATIVE LYMPHOCYTE (SMH): 11.1 % (ref 18–48)
RELATIVE LYMPHOCYTE (SMH): 13.5 % (ref 18–48)
RELATIVE LYMPHOCYTE (SMH): 6.8 % (ref 18–48)
RELATIVE LYMPHOCYTE (SMH): 63.9 % (ref 18–48)
RELATIVE MONOCYTE (SMH): 2 % (ref 4–15)
RELATIVE MONOCYTE (SMH): 2.2 % (ref 4–15)
RELATIVE MONOCYTE (SMH): 4.3 % (ref 4–15)
RELATIVE MONOCYTE (SMH): 4.6 % (ref 4–15)
RELATIVE NEUTROPHIL (SMH): 26.9 % (ref 38–73)
RELATIVE NEUTROPHIL (SMH): 83.3 % (ref 38–73)
RELATIVE NEUTROPHIL (SMH): 85.8 % (ref 38–73)
RELATIVE NEUTROPHIL (SMH): 86.6 % (ref 38–73)
RIGHT VENTRICLE DIASTOLIC BASEL DIMENSION: 2.8 CM
RIGHT VENTRICULAR END-DIASTOLIC DIMENSION: 2.8 CM
SAMPLE: ABNORMAL
SITE: ABNORMAL
SODIUM BLD-SCNC: 137 MMOL/L (ref 136–145)
SODIUM BLD-SCNC: 137 MMOL/L (ref 136–145)
SODIUM BLD-SCNC: 138 MMOL/L (ref 136–145)
SODIUM BLD-SCNC: 139 MMOL/L (ref 136–145)
SODIUM BLD-SCNC: 140 MMOL/L (ref 136–145)
SODIUM SERPL-SCNC: 136 MMOL/L (ref 136–145)
SODIUM SERPL-SCNC: 141 MMOL/L (ref 136–145)
SODIUM SERPL-SCNC: 141 MMOL/L (ref 136–145)
SODIUM SERPL-SCNC: 142 MMOL/L (ref 136–145)
SODIUM SERPL-SCNC: 142 MMOL/L (ref 136–145)
SODIUM SERPL-SCNC: 143 MMOL/L (ref 136–145)
SODIUM SERPL-SCNC: 144 MMOL/L (ref 136–145)
SP GR UR STRIP: 1.01
SP02: 90
SPECIMEN SOURCE: ABNORMAL
SPECIMEN SOURCE: ABNORMAL
SQUAMOUS #/AREA URNS AUTO: <1 /HPF
TDI LATERAL: 0.05 M/S
TDI SEPTAL: 0.05 M/S
TDI: 0.05 M/S
TROPONIN HIGH SENSITIVE (SMH): 133.2 PG/ML
TROPONIN HIGH SENSITIVE (SMH): 24 PG/ML
TROPONIN HIGH SENSITIVE (SMH): 3796.4 PG/ML
TROPONIN HIGH SENSITIVE (SMH): 3802.8 PG/ML
TROPONIN HIGH SENSITIVE (SMH): 4037.7 PG/ML
TROPONIN HIGH SENSITIVE (SMH): 521.7 PG/ML
TROPONIN HIGH SENSITIVE (SMH): 5590.4 PG/ML
TROPONIN HIGH SENSITIVE (SMH): 7919.5 PG/ML
UROBILINOGEN UR STRIP-ACNC: NEGATIVE EU/DL
VT: 450
VT: 550
VT: 550
WBC # BLD AUTO: 10.06 K/UL (ref 3.9–12.7)
WBC # BLD AUTO: 10.76 K/UL (ref 3.9–12.7)
WBC # BLD AUTO: 17.55 K/UL (ref 3.9–12.7)
WBC # BLD AUTO: 20.86 K/UL (ref 3.9–12.7)
WBC # BLD AUTO: 6.81 K/UL (ref 3.9–12.7)
WBC # BLD AUTO: 8.68 K/UL (ref 3.9–12.7)
WBC # BLD AUTO: 9.69 K/UL (ref 3.9–12.7)
WBC #/AREA URNS AUTO: 6 /HPF
Z-SCORE OF LEFT VENTRICULAR DIMENSION IN END DIASTOLE: -0.79
Z-SCORE OF LEFT VENTRICULAR DIMENSION IN END SYSTOLE: 2.46

## 2025-01-01 PROCEDURE — 96365 THER/PROPH/DIAG IV INF INIT: CPT

## 2025-01-01 PROCEDURE — 85610 PROTHROMBIN TIME: CPT | Performed by: STUDENT IN AN ORGANIZED HEALTH CARE EDUCATION/TRAINING PROGRAM

## 2025-01-01 PROCEDURE — 96375 TX/PRO/DX INJ NEW DRUG ADDON: CPT

## 2025-01-01 PROCEDURE — 85014 HEMATOCRIT: CPT

## 2025-01-01 PROCEDURE — 99291 CRITICAL CARE FIRST HOUR: CPT | Mod: ,,, | Performed by: STUDENT IN AN ORGANIZED HEALTH CARE EDUCATION/TRAINING PROGRAM

## 2025-01-01 PROCEDURE — 99900031 HC PATIENT EDUCATION (STAT)

## 2025-01-01 PROCEDURE — 82310 ASSAY OF CALCIUM: CPT | Performed by: STUDENT IN AN ORGANIZED HEALTH CARE EDUCATION/TRAINING PROGRAM

## 2025-01-01 PROCEDURE — 82550 ASSAY OF CK (CPK): CPT | Performed by: INTERNAL MEDICINE

## 2025-01-01 PROCEDURE — 63600175 PHARM REV CODE 636 W HCPCS: Performed by: STUDENT IN AN ORGANIZED HEALTH CARE EDUCATION/TRAINING PROGRAM

## 2025-01-01 PROCEDURE — 93010 ELECTROCARDIOGRAM REPORT: CPT | Mod: ,,, | Performed by: GENERAL PRACTICE

## 2025-01-01 PROCEDURE — 37799 UNLISTED PX VASCULAR SURGERY: CPT

## 2025-01-01 PROCEDURE — 25000003 PHARM REV CODE 250: Performed by: STUDENT IN AN ORGANIZED HEALTH CARE EDUCATION/TRAINING PROGRAM

## 2025-01-01 PROCEDURE — 99223 1ST HOSP IP/OBS HIGH 75: CPT | Mod: GT,,, | Performed by: INTERNAL MEDICINE

## 2025-01-01 PROCEDURE — 83605 ASSAY OF LACTIC ACID: CPT | Performed by: STUDENT IN AN ORGANIZED HEALTH CARE EDUCATION/TRAINING PROGRAM

## 2025-01-01 PROCEDURE — 36620 INSERTION CATHETER ARTERY: CPT

## 2025-01-01 PROCEDURE — 84484 ASSAY OF TROPONIN QUANT: CPT | Performed by: STUDENT IN AN ORGANIZED HEALTH CARE EDUCATION/TRAINING PROGRAM

## 2025-01-01 PROCEDURE — 82803 BLOOD GASES ANY COMBINATION: CPT

## 2025-01-01 PROCEDURE — 82977 ASSAY OF GGT: CPT | Performed by: HOSPITALIST

## 2025-01-01 PROCEDURE — 63600175 PHARM REV CODE 636 W HCPCS: Performed by: INTERNAL MEDICINE

## 2025-01-01 PROCEDURE — 84295 ASSAY OF SERUM SODIUM: CPT

## 2025-01-01 PROCEDURE — 25000003 PHARM REV CODE 250

## 2025-01-01 PROCEDURE — 85027 COMPLETE CBC AUTOMATED: CPT | Performed by: STUDENT IN AN ORGANIZED HEALTH CARE EDUCATION/TRAINING PROGRAM

## 2025-01-01 PROCEDURE — 36600 WITHDRAWAL OF ARTERIAL BLOOD: CPT

## 2025-01-01 PROCEDURE — 86803 HEPATITIS C AB TEST: CPT | Performed by: EMERGENCY MEDICINE

## 2025-01-01 PROCEDURE — 99900035 HC TECH TIME PER 15 MIN (STAT)

## 2025-01-01 PROCEDURE — 82010 KETONE BODYS QUAN: CPT | Performed by: STUDENT IN AN ORGANIZED HEALTH CARE EDUCATION/TRAINING PROGRAM

## 2025-01-01 PROCEDURE — 96366 THER/PROPH/DIAG IV INF ADDON: CPT

## 2025-01-01 PROCEDURE — 99900026 HC AIRWAY MAINTENANCE (STAT)

## 2025-01-01 PROCEDURE — 27100171 HC OXYGEN HIGH FLOW UP TO 24 HOURS

## 2025-01-01 PROCEDURE — 93306 TTE W/DOPPLER COMPLETE: CPT

## 2025-01-01 PROCEDURE — 20000000 HC ICU ROOM

## 2025-01-01 PROCEDURE — 84132 ASSAY OF SERUM POTASSIUM: CPT

## 2025-01-01 PROCEDURE — 63600175 PHARM REV CODE 636 W HCPCS: Mod: JA | Performed by: STUDENT IN AN ORGANIZED HEALTH CARE EDUCATION/TRAINING PROGRAM

## 2025-01-01 PROCEDURE — 25500020 PHARM REV CODE 255: Performed by: STUDENT IN AN ORGANIZED HEALTH CARE EDUCATION/TRAINING PROGRAM

## 2025-01-01 PROCEDURE — 93306 TTE W/DOPPLER COMPLETE: CPT | Mod: 26,,, | Performed by: GENERAL PRACTICE

## 2025-01-01 PROCEDURE — 94761 N-INVAS EAR/PLS OXIMETRY MLT: CPT | Mod: XB

## 2025-01-01 PROCEDURE — 93005 ELECTROCARDIOGRAM TRACING: CPT | Performed by: GENERAL PRACTICE

## 2025-01-01 PROCEDURE — 25000003 PHARM REV CODE 250: Performed by: INTERNAL MEDICINE

## 2025-01-01 PROCEDURE — 87389 HIV-1 AG W/HIV-1&-2 AB AG IA: CPT | Performed by: EMERGENCY MEDICINE

## 2025-01-01 PROCEDURE — 63600175 PHARM REV CODE 636 W HCPCS

## 2025-01-01 PROCEDURE — 82272 OCCULT BLD FECES 1-3 TESTS: CPT | Performed by: STUDENT IN AN ORGANIZED HEALTH CARE EDUCATION/TRAINING PROGRAM

## 2025-01-01 PROCEDURE — 94002 VENT MGMT INPAT INIT DAY: CPT

## 2025-01-01 PROCEDURE — 85025 COMPLETE CBC W/AUTO DIFF WBC: CPT | Performed by: STUDENT IN AN ORGANIZED HEALTH CARE EDUCATION/TRAINING PROGRAM

## 2025-01-01 PROCEDURE — 94003 VENT MGMT INPAT SUBQ DAY: CPT

## 2025-01-01 PROCEDURE — 25000242 PHARM REV CODE 250 ALT 637 W/ HCPCS: Performed by: STUDENT IN AN ORGANIZED HEALTH CARE EDUCATION/TRAINING PROGRAM

## 2025-01-01 PROCEDURE — 87070 CULTURE OTHR SPECIMN AEROBIC: CPT | Performed by: STUDENT IN AN ORGANIZED HEALTH CARE EDUCATION/TRAINING PROGRAM

## 2025-01-01 PROCEDURE — 84145 PROCALCITONIN (PCT): CPT | Performed by: STUDENT IN AN ORGANIZED HEALTH CARE EDUCATION/TRAINING PROGRAM

## 2025-01-01 PROCEDURE — 82330 ASSAY OF CALCIUM: CPT

## 2025-01-01 PROCEDURE — 63600175 PHARM REV CODE 636 W HCPCS: Performed by: HOSPITALIST

## 2025-01-01 PROCEDURE — B548ZZA ULTRASONOGRAPHY OF SUPERIOR VENA CAVA, GUIDANCE: ICD-10-PCS | Performed by: ANESTHESIOLOGY

## 2025-01-01 PROCEDURE — 80053 COMPREHEN METABOLIC PANEL: CPT | Performed by: STUDENT IN AN ORGANIZED HEALTH CARE EDUCATION/TRAINING PROGRAM

## 2025-01-01 PROCEDURE — G0426 INPT/ED TELECONSULT50: HCPCS | Mod: GT,,, | Performed by: PSYCHIATRY & NEUROLOGY

## 2025-01-01 PROCEDURE — 5A12012 PERFORMANCE OF CARDIAC OUTPUT, SINGLE, MANUAL: ICD-10-PCS | Performed by: INTERNAL MEDICINE

## 2025-01-01 PROCEDURE — 11000001 HC ACUTE MED/SURG PRIVATE ROOM

## 2025-01-01 PROCEDURE — 81003 URINALYSIS AUTO W/O SCOPE: CPT | Performed by: STUDENT IN AN ORGANIZED HEALTH CARE EDUCATION/TRAINING PROGRAM

## 2025-01-01 PROCEDURE — 36556 INSERT NON-TUNNEL CV CATH: CPT | Mod: ,,, | Performed by: ANESTHESIOLOGY

## 2025-01-01 PROCEDURE — 94799 UNLISTED PULMONARY SVC/PX: CPT

## 2025-01-01 PROCEDURE — 25000003 PHARM REV CODE 250: Performed by: HOSPITALIST

## 2025-01-01 PROCEDURE — 96374 THER/PROPH/DIAG INJ IV PUSH: CPT | Mod: 59

## 2025-01-01 PROCEDURE — 27000221 HC OXYGEN, UP TO 24 HOURS

## 2025-01-01 PROCEDURE — 5A1945Z RESPIRATORY VENTILATION, 24-96 CONSECUTIVE HOURS: ICD-10-PCS | Performed by: STUDENT IN AN ORGANIZED HEALTH CARE EDUCATION/TRAINING PROGRAM

## 2025-01-01 PROCEDURE — 83690 ASSAY OF LIPASE: CPT | Performed by: STUDENT IN AN ORGANIZED HEALTH CARE EDUCATION/TRAINING PROGRAM

## 2025-01-01 PROCEDURE — 36415 COLL VENOUS BLD VENIPUNCTURE: CPT | Performed by: STUDENT IN AN ORGANIZED HEALTH CARE EDUCATION/TRAINING PROGRAM

## 2025-01-01 PROCEDURE — 0D9670Z DRAINAGE OF STOMACH WITH DRAINAGE DEVICE, VIA NATURAL OR ARTIFICIAL OPENING: ICD-10-PCS | Performed by: STUDENT IN AN ORGANIZED HEALTH CARE EDUCATION/TRAINING PROGRAM

## 2025-01-01 PROCEDURE — 83735 ASSAY OF MAGNESIUM: CPT | Performed by: STUDENT IN AN ORGANIZED HEALTH CARE EDUCATION/TRAINING PROGRAM

## 2025-01-01 PROCEDURE — 82040 ASSAY OF SERUM ALBUMIN: CPT | Performed by: STUDENT IN AN ORGANIZED HEALTH CARE EDUCATION/TRAINING PROGRAM

## 2025-01-01 PROCEDURE — 94640 AIRWAY INHALATION TREATMENT: CPT

## 2025-01-01 PROCEDURE — 95816 EEG AWAKE AND DROWSY: CPT | Mod: 26,,, | Performed by: PSYCHIATRY & NEUROLOGY

## 2025-01-01 PROCEDURE — 99291 CRITICAL CARE FIRST HOUR: CPT

## 2025-01-01 PROCEDURE — 83880 ASSAY OF NATRIURETIC PEPTIDE: CPT | Performed by: STUDENT IN AN ORGANIZED HEALTH CARE EDUCATION/TRAINING PROGRAM

## 2025-01-01 PROCEDURE — 02HV33Z INSERTION OF INFUSION DEVICE INTO SUPERIOR VENA CAVA, PERCUTANEOUS APPROACH: ICD-10-PCS | Performed by: ANESTHESIOLOGY

## 2025-01-01 PROCEDURE — 82435 ASSAY OF BLOOD CHLORIDE: CPT | Performed by: STUDENT IN AN ORGANIZED HEALTH CARE EDUCATION/TRAINING PROGRAM

## 2025-01-01 PROCEDURE — 85025 COMPLETE CBC W/AUTO DIFF WBC: CPT | Performed by: HOSPITALIST

## 2025-01-01 PROCEDURE — 80307 DRUG TEST PRSMV CHEM ANLYZR: CPT | Performed by: STUDENT IN AN ORGANIZED HEALTH CARE EDUCATION/TRAINING PROGRAM

## 2025-01-01 PROCEDURE — 95819 EEG AWAKE AND ASLEEP: CPT

## 2025-01-01 PROCEDURE — 82077 ASSAY SPEC XCP UR&BREATH IA: CPT | Performed by: STUDENT IN AN ORGANIZED HEALTH CARE EDUCATION/TRAINING PROGRAM

## 2025-01-01 RX ORDER — NOREPINEPHRINE BITARTRATE/D5W 4MG/250ML
PLASTIC BAG, INJECTION (ML) INTRAVENOUS
Status: COMPLETED
Start: 2025-01-01 | End: 2025-01-01

## 2025-01-01 RX ORDER — POTASSIUM CHLORIDE 7.45 MG/ML
40 INJECTION INTRAVENOUS
Status: DISCONTINUED | OUTPATIENT
Start: 2025-01-01 | End: 2025-01-01 | Stop reason: HOSPADM

## 2025-01-01 RX ORDER — PAROXETINE 40 MG/1
40 TABLET, FILM COATED ORAL DAILY
COMMUNITY
Start: 2025-01-01

## 2025-01-01 RX ORDER — ACETAMINOPHEN 10 MG/ML
INJECTION, SOLUTION INTRAVENOUS ONCE
Status: COMPLETED | OUTPATIENT
Start: 2025-01-01 | End: 2025-01-01

## 2025-01-01 RX ORDER — CALCIUM CHLORIDE INJECTION 100 MG/ML
INJECTION, SOLUTION INTRAVENOUS CODE/TRAUMA/SEDATION MEDICATION
Status: COMPLETED | OUTPATIENT
Start: 2025-01-01 | End: 2025-01-01

## 2025-01-01 RX ORDER — EPINEPHRINE 0.1 MG/ML
INJECTION INTRAVENOUS CODE/TRAUMA/SEDATION MEDICATION
Status: COMPLETED | OUTPATIENT
Start: 2025-01-01 | End: 2025-01-01

## 2025-01-01 RX ORDER — MORPHINE SULFATE 2 MG/ML
6 INJECTION, SOLUTION INTRAMUSCULAR; INTRAVENOUS
Status: DISCONTINUED | OUTPATIENT
Start: 2025-01-01 | End: 2025-01-01 | Stop reason: HOSPADM

## 2025-01-01 RX ORDER — FENTANYL CITRATE-0.9 % NACL/PF 10 MCG/ML
0-250 PLASTIC BAG, INJECTION (ML) INTRAVENOUS CONTINUOUS
Refills: 0 | Status: DISCONTINUED | OUTPATIENT
Start: 2025-01-01 | End: 2025-01-01 | Stop reason: HOSPADM

## 2025-01-01 RX ORDER — PROPOFOL 10 MG/ML
INJECTION, EMULSION INTRAVENOUS
Status: COMPLETED
Start: 2025-01-01 | End: 2025-01-01

## 2025-01-01 RX ORDER — ALBUTEROL SULFATE 0.83 MG/ML
10 SOLUTION RESPIRATORY (INHALATION) ONCE
Status: COMPLETED | OUTPATIENT
Start: 2025-01-01 | End: 2025-01-01

## 2025-01-01 RX ORDER — POTASSIUM CHLORIDE 14.9 MG/ML
20 INJECTION INTRAVENOUS ONCE
Status: COMPLETED | OUTPATIENT
Start: 2025-01-01 | End: 2025-01-01

## 2025-01-01 RX ORDER — ATROPINE SULFATE 0.1 MG/ML
INJECTION INTRAVENOUS CODE/TRAUMA/SEDATION MEDICATION
Status: COMPLETED | OUTPATIENT
Start: 2025-01-01 | End: 2025-01-01

## 2025-01-01 RX ORDER — LOSARTAN POTASSIUM 25 MG/1
25 TABLET ORAL DAILY
COMMUNITY
Start: 2025-01-01

## 2025-01-01 RX ORDER — NALOXONE HCL 0.4 MG/ML
VIAL (ML) INJECTION CODE/TRAUMA/SEDATION MEDICATION
Status: COMPLETED | OUTPATIENT
Start: 2025-01-01 | End: 2025-01-01

## 2025-01-01 RX ORDER — MAGNESIUM SULFATE HEPTAHYDRATE 40 MG/ML
2 INJECTION, SOLUTION INTRAVENOUS
Status: DISCONTINUED | OUTPATIENT
Start: 2025-01-01 | End: 2025-01-01 | Stop reason: HOSPADM

## 2025-01-01 RX ORDER — SODIUM BICARBONATE 1 MEQ/ML
100 SYRINGE (ML) INTRAVENOUS ONCE
Status: COMPLETED | OUTPATIENT
Start: 2025-01-01 | End: 2025-01-01

## 2025-01-01 RX ORDER — NOREPINEPHRINE BIT/0.9 % NACL 32MG/250ML
0-3 PLASTIC BAG, INJECTION (ML) INTRAVENOUS CONTINUOUS
Status: DISCONTINUED | OUTPATIENT
Start: 2025-01-01 | End: 2025-01-01 | Stop reason: HOSPADM

## 2025-01-01 RX ORDER — PANTOPRAZOLE SODIUM 40 MG/10ML
40 INJECTION, POWDER, LYOPHILIZED, FOR SOLUTION INTRAVENOUS 2 TIMES DAILY
Status: DISCONTINUED | OUTPATIENT
Start: 2025-01-01 | End: 2025-01-01 | Stop reason: HOSPADM

## 2025-01-01 RX ORDER — SODIUM BICARBONATE 1 MEQ/ML
SYRINGE (ML) INTRAVENOUS CODE/TRAUMA/SEDATION MEDICATION
Status: COMPLETED | OUTPATIENT
Start: 2025-01-01 | End: 2025-01-01

## 2025-01-01 RX ORDER — GLUCAGON 1 MG
1 KIT INJECTION
Status: DISCONTINUED | OUTPATIENT
Start: 2025-01-01 | End: 2025-01-01

## 2025-01-01 RX ORDER — SODIUM CHLORIDE 0.9 % (FLUSH) 0.9 %
10 SYRINGE (ML) INJECTION
Status: DISCONTINUED | OUTPATIENT
Start: 2025-01-01 | End: 2025-01-01 | Stop reason: HOSPADM

## 2025-01-01 RX ORDER — INSULIN ASPART 100 [IU]/ML
0-5 INJECTION, SOLUTION INTRAVENOUS; SUBCUTANEOUS
Status: DISCONTINUED | OUTPATIENT
Start: 2025-01-01 | End: 2025-01-01

## 2025-01-01 RX ORDER — CALCIUM GLUCONATE 20 MG/ML
1 INJECTION, SOLUTION INTRAVENOUS ONCE
Status: DISCONTINUED | OUTPATIENT
Start: 2025-01-01 | End: 2025-01-01 | Stop reason: HOSPADM

## 2025-01-01 RX ORDER — PANTOPRAZOLE SODIUM 40 MG/10ML
40 INJECTION, POWDER, LYOPHILIZED, FOR SOLUTION INTRAVENOUS DAILY
Status: DISCONTINUED | OUTPATIENT
Start: 2025-01-01 | End: 2025-01-01

## 2025-01-01 RX ORDER — PROPOFOL 10 MG/ML
0-50 INJECTION, EMULSION INTRAVENOUS CONTINUOUS
Status: DISCONTINUED | OUTPATIENT
Start: 2025-01-01 | End: 2025-01-01

## 2025-01-01 RX ORDER — POTASSIUM CHLORIDE 14.9 MG/ML
40 INJECTION INTRAVENOUS ONCE
Status: COMPLETED | OUTPATIENT
Start: 2025-01-01 | End: 2025-01-01

## 2025-01-01 RX ORDER — INSULIN ASPART 100 [IU]/ML
0-10 INJECTION, SOLUTION INTRAVENOUS; SUBCUTANEOUS EVERY 6 HOURS PRN
Status: DISCONTINUED | OUTPATIENT
Start: 2025-01-01 | End: 2025-01-01 | Stop reason: HOSPADM

## 2025-01-01 RX ORDER — CALCIUM GLUCONATE 20 MG/ML
1 INJECTION, SOLUTION INTRAVENOUS
Status: DISCONTINUED | OUTPATIENT
Start: 2025-01-01 | End: 2025-01-01 | Stop reason: HOSPADM

## 2025-01-01 RX ORDER — FENTANYL CITRATE 50 UG/ML
50 INJECTION, SOLUTION INTRAMUSCULAR; INTRAVENOUS
Refills: 0 | Status: COMPLETED | OUTPATIENT
Start: 2025-01-01 | End: 2025-01-01

## 2025-01-01 RX ORDER — CALCIUM GLUCONATE 20 MG/ML
1 INJECTION, SOLUTION INTRAVENOUS EVERY 10 MIN PRN
Status: DISCONTINUED | OUTPATIENT
Start: 2025-01-01 | End: 2025-01-01 | Stop reason: HOSPADM

## 2025-01-01 RX ORDER — PROPOFOL 10 MG/ML
0-50 INJECTION, EMULSION INTRAVENOUS CONTINUOUS
Status: DISCONTINUED | OUTPATIENT
Start: 2025-01-01 | End: 2025-01-01 | Stop reason: HOSPADM

## 2025-01-01 RX ORDER — IBUPROFEN 200 MG
24 TABLET ORAL
Status: DISCONTINUED | OUTPATIENT
Start: 2025-01-01 | End: 2025-01-01

## 2025-01-01 RX ORDER — CALCIUM GLUCONATE 20 MG/ML
2 INJECTION, SOLUTION INTRAVENOUS
Status: DISCONTINUED | OUTPATIENT
Start: 2025-01-01 | End: 2025-01-01 | Stop reason: HOSPADM

## 2025-01-01 RX ORDER — GLUCAGON 1 MG
1 KIT INJECTION
Status: DISCONTINUED | OUTPATIENT
Start: 2025-01-01 | End: 2025-01-01 | Stop reason: HOSPADM

## 2025-01-01 RX ORDER — MUPIROCIN 20 MG/G
OINTMENT TOPICAL 2 TIMES DAILY
Status: DISCONTINUED | OUTPATIENT
Start: 2025-01-01 | End: 2025-01-01 | Stop reason: HOSPADM

## 2025-01-01 RX ORDER — CALCIUM GLUCONATE 20 MG/ML
3 INJECTION, SOLUTION INTRAVENOUS
Status: DISCONTINUED | OUTPATIENT
Start: 2025-01-01 | End: 2025-01-01 | Stop reason: HOSPADM

## 2025-01-01 RX ORDER — IBUPROFEN 200 MG
16 TABLET ORAL
Status: DISCONTINUED | OUTPATIENT
Start: 2025-01-01 | End: 2025-01-01

## 2025-01-01 RX ORDER — SODIUM CHLORIDE, SODIUM LACTATE, POTASSIUM CHLORIDE, CALCIUM CHLORIDE 600; 310; 30; 20 MG/100ML; MG/100ML; MG/100ML; MG/100ML
INJECTION, SOLUTION INTRAVENOUS CONTINUOUS
Status: DISCONTINUED | OUTPATIENT
Start: 2025-01-01 | End: 2025-01-01 | Stop reason: HOSPADM

## 2025-01-01 RX ORDER — SODIUM BICARBONATE 1 MEQ/ML
50 SYRINGE (ML) INTRAVENOUS ONCE
Status: COMPLETED | OUTPATIENT
Start: 2025-01-01 | End: 2025-01-01

## 2025-01-01 RX ORDER — ENOXAPARIN SODIUM 100 MG/ML
40 INJECTION SUBCUTANEOUS EVERY 24 HOURS
Status: DISCONTINUED | OUTPATIENT
Start: 2025-01-01 | End: 2025-01-01

## 2025-01-01 RX ORDER — NOREPINEPHRINE BITARTRATE/D5W 4MG/250ML
0-3 PLASTIC BAG, INJECTION (ML) INTRAVENOUS CONTINUOUS
Status: DISCONTINUED | OUTPATIENT
Start: 2025-01-01 | End: 2025-01-01

## 2025-01-01 RX ADMIN — NOREPINEPHRINE BITARTRATE 1 MCG/KG/MIN: 1 INJECTION, SOLUTION, CONCENTRATE INTRAVENOUS at 11:07

## 2025-01-01 RX ADMIN — Medication 150 MCG/HR: at 04:07

## 2025-01-01 RX ADMIN — CALCIUM CHLORIDE 1 G: 100 INJECTION, SOLUTION INTRAVENOUS at 09:07

## 2025-01-01 RX ADMIN — NOREPINEPHRINE BITARTRATE 0.3 MCG/KG/MIN: 1 INJECTION, SOLUTION, CONCENTRATE INTRAVENOUS at 06:07

## 2025-01-01 RX ADMIN — PIPERACILLIN SODIUM AND TAZOBACTAM SODIUM 4.5 G: 4; .5 INJECTION, POWDER, LYOPHILIZED, FOR SOLUTION INTRAVENOUS at 12:07

## 2025-01-01 RX ADMIN — NOREPINEPHRINE BITARTRATE 1.58 MCG/KG/MIN: 1 INJECTION, SOLUTION, CONCENTRATE INTRAVENOUS at 05:07

## 2025-01-01 RX ADMIN — POTASSIUM CHLORIDE 20 MEQ: 14.9 INJECTION, SOLUTION INTRAVENOUS at 01:07

## 2025-01-01 RX ADMIN — SODIUM BICARBONATE 50 MEQ: 84 INJECTION INTRAVENOUS at 02:07

## 2025-01-01 RX ADMIN — SODIUM CHLORIDE, POTASSIUM CHLORIDE, SODIUM LACTATE AND CALCIUM CHLORIDE: 600; 310; 30; 20 INJECTION, SOLUTION INTRAVENOUS at 12:07

## 2025-01-01 RX ADMIN — CALCIUM GLUCONATE 2 G: 20 INJECTION, SOLUTION INTRAVENOUS at 02:07

## 2025-01-01 RX ADMIN — VASOPRESSIN 0.04 UNITS/MIN: 20 INJECTION INTRAVENOUS at 09:07

## 2025-01-01 RX ADMIN — IOHEXOL 100 ML: 350 INJECTION, SOLUTION INTRAVENOUS at 12:07

## 2025-01-01 RX ADMIN — AMIODARONE HYDROCHLORIDE 150 MG: 1.5 INJECTION, SOLUTION INTRAVENOUS at 01:07

## 2025-01-01 RX ADMIN — NOREPINEPHRINE BITARTRATE 0.3 MCG/KG/MIN: 4 INJECTION, SOLUTION INTRAVENOUS at 05:07

## 2025-01-01 RX ADMIN — PANTOPRAZOLE SODIUM 40 MG: 40 INJECTION, POWDER, FOR SOLUTION INTRAVENOUS at 08:07

## 2025-01-01 RX ADMIN — DEXTROSE MONOHYDRATE 12.5 G: 25 INJECTION, SOLUTION INTRAVENOUS at 06:07

## 2025-01-01 RX ADMIN — SODIUM CHLORIDE, POTASSIUM CHLORIDE, SODIUM LACTATE AND CALCIUM CHLORIDE: 600; 310; 30; 20 INJECTION, SOLUTION INTRAVENOUS at 05:07

## 2025-01-01 RX ADMIN — NOREPINEPHRINE BITARTRATE 0.15 MCG/KG/MIN: 4 INJECTION, SOLUTION INTRAVENOUS at 09:07

## 2025-01-01 RX ADMIN — AMIODARONE HYDROCHLORIDE 1 MG/MIN: 1.8 INJECTION, SOLUTION INTRAVENOUS at 01:07

## 2025-01-01 RX ADMIN — ATROPINE SULFATE 1 MG: 0.1 INJECTION, SOLUTION INTRAVENOUS at 09:07

## 2025-01-01 RX ADMIN — SODIUM CHLORIDE, POTASSIUM CHLORIDE, SODIUM LACTATE AND CALCIUM CHLORIDE 1000 ML: 600; 310; 30; 20 INJECTION, SOLUTION INTRAVENOUS at 09:07

## 2025-01-01 RX ADMIN — NALOXONE HYDROCHLORIDE 2 MG: 0.4 INJECTION, SOLUTION INTRAMUSCULAR; INTRAVENOUS; SUBCUTANEOUS at 09:07

## 2025-01-01 RX ADMIN — PIPERACILLIN SODIUM AND TAZOBACTAM SODIUM 4.5 G: 4; .5 INJECTION, POWDER, LYOPHILIZED, FOR SOLUTION INTRAVENOUS at 04:07

## 2025-01-01 RX ADMIN — SODIUM BICARBONATE: 84 INJECTION, SOLUTION INTRAVENOUS at 10:07

## 2025-01-01 RX ADMIN — EPINEPHRINE 1 MG: 0.1 INJECTION, SOLUTION ENDOTRACHEAL; INTRACARDIAC; INTRAVENOUS at 10:07

## 2025-01-01 RX ADMIN — NOREPINEPHRINE BITARTRATE 2.4 MCG/KG/MIN: 1 INJECTION, SOLUTION, CONCENTRATE INTRAVENOUS at 10:07

## 2025-01-01 RX ADMIN — SODIUM BICARBONATE: 84 INJECTION, SOLUTION INTRAVENOUS at 03:07

## 2025-01-01 RX ADMIN — ACETAMINOPHEN: 10 INJECTION INTRAVENOUS at 05:07

## 2025-01-01 RX ADMIN — PIPERACILLIN SODIUM AND TAZOBACTAM SODIUM 4.5 G: 4; .5 INJECTION, POWDER, LYOPHILIZED, FOR SOLUTION INTRAVENOUS at 02:07

## 2025-01-01 RX ADMIN — PROPOFOL 50 MCG/KG/MIN: 10 INJECTION, EMULSION INTRAVENOUS at 04:07

## 2025-01-01 RX ADMIN — PROPOFOL 50 MCG/KG/MIN: 10 INJECTION, EMULSION INTRAVENOUS at 11:07

## 2025-01-01 RX ADMIN — PROPOFOL 10 MCG/KG/MIN: 10 INJECTION, EMULSION INTRAVENOUS at 10:07

## 2025-01-01 RX ADMIN — PROPOFOL 50 MCG/KG/MIN: 10 INJECTION, EMULSION INTRAVENOUS at 02:07

## 2025-01-01 RX ADMIN — DEXTROSE MONOHYDRATE 50 G: 25 INJECTION, SOLUTION INTRAVENOUS at 02:07

## 2025-01-01 RX ADMIN — SODIUM CHLORIDE, POTASSIUM CHLORIDE, SODIUM LACTATE AND CALCIUM CHLORIDE 1000 ML: 600; 310; 30; 20 INJECTION, SOLUTION INTRAVENOUS at 10:07

## 2025-01-01 RX ADMIN — VASOPRESSIN 0.04 UNITS/MIN: 20 INJECTION INTRAVENOUS at 04:07

## 2025-01-01 RX ADMIN — SODIUM CHLORIDE 500 ML: 9 INJECTION, SOLUTION INTRAVENOUS at 10:07

## 2025-01-01 RX ADMIN — SODIUM BICARBONATE: 84 INJECTION, SOLUTION INTRAVENOUS at 04:07

## 2025-01-01 RX ADMIN — FENTANYL CITRATE 50 MCG: 50 INJECTION INTRAMUSCULAR; INTRAVENOUS at 11:07

## 2025-01-01 RX ADMIN — SODIUM BICARBONATE: 84 INJECTION, SOLUTION INTRAVENOUS at 08:07

## 2025-01-01 RX ADMIN — SODIUM BICARBONATE 50 MEQ: 84 INJECTION, SOLUTION INTRAVENOUS at 10:07

## 2025-01-01 RX ADMIN — Medication 25 MCG/HR: at 01:07

## 2025-01-01 RX ADMIN — PANTOPRAZOLE SODIUM 40 MG: 40 INJECTION, POWDER, FOR SOLUTION INTRAVENOUS at 01:07

## 2025-01-01 RX ADMIN — VASOPRESSIN 0.04 UNITS/MIN: 20 INJECTION INTRAVENOUS at 05:07

## 2025-01-01 RX ADMIN — MUPIROCIN 1 G: 20 OINTMENT TOPICAL at 08:07

## 2025-01-01 RX ADMIN — SODIUM CHLORIDE, POTASSIUM CHLORIDE, SODIUM LACTATE AND CALCIUM CHLORIDE: 600; 310; 30; 20 INJECTION, SOLUTION INTRAVENOUS at 08:07

## 2025-01-01 RX ADMIN — SODIUM BICARBONATE 100 MEQ: 84 INJECTION INTRAVENOUS at 05:07

## 2025-01-01 RX ADMIN — MUPIROCIN 1 G: 20 OINTMENT TOPICAL at 09:07

## 2025-01-01 RX ADMIN — ALBUTEROL SULFATE 10 MG: 2.5 SOLUTION RESPIRATORY (INHALATION) at 02:07

## 2025-01-01 RX ADMIN — PROPOFOL 20 MCG/KG/MIN: 10 INJECTION, EMULSION INTRAVENOUS at 10:07

## 2025-01-01 RX ADMIN — SODIUM BICARBONATE: 84 INJECTION, SOLUTION INTRAVENOUS at 02:07

## 2025-01-01 RX ADMIN — INSULIN ASPART 2 UNITS: 100 INJECTION, SOLUTION INTRAVENOUS; SUBCUTANEOUS at 12:07

## 2025-01-01 RX ADMIN — POTASSIUM CHLORIDE 40 MEQ: 14.9 INJECTION, SOLUTION INTRAVENOUS at 12:07

## 2025-01-01 RX ADMIN — PANTOPRAZOLE SODIUM 40 MG: 40 INJECTION, POWDER, FOR SOLUTION INTRAVENOUS at 09:07

## 2025-01-01 RX ADMIN — SODIUM CHLORIDE, POTASSIUM CHLORIDE, SODIUM LACTATE AND CALCIUM CHLORIDE 1000 ML: 600; 310; 30; 20 INJECTION, SOLUTION INTRAVENOUS at 12:07

## 2025-01-01 RX ADMIN — CALCIUM GLUCONATE 2 G: 20 INJECTION, SOLUTION INTRAVENOUS at 10:07

## 2025-01-01 RX ADMIN — PROPOFOL 50 MCG/KG/MIN: 10 INJECTION, EMULSION INTRAVENOUS at 09:07

## 2025-01-01 RX ADMIN — OCTREOTIDE ACETATE 50 MCG/HR: 500 INJECTION, SOLUTION INTRAVENOUS; SUBCUTANEOUS at 12:07

## 2025-01-01 RX ADMIN — DEXTROSE MONOHYDRATE 12.5 G: 25 INJECTION, SOLUTION INTRAVENOUS at 03:07

## 2025-01-01 RX ADMIN — SODIUM CHLORIDE 1000 ML: 9 INJECTION, SOLUTION INTRAVENOUS at 05:07

## 2025-01-01 RX ADMIN — VASOPRESSIN 0.04 UNITS/MIN: 20 INJECTION INTRAVENOUS at 12:07

## 2025-01-01 RX ADMIN — PIPERACILLIN SODIUM AND TAZOBACTAM SODIUM 4.5 G: 4; .5 INJECTION, POWDER, LYOPHILIZED, FOR SOLUTION INTRAVENOUS at 09:07

## 2025-01-01 RX ADMIN — SODIUM CHLORIDE, POTASSIUM CHLORIDE, SODIUM LACTATE AND CALCIUM CHLORIDE: 600; 310; 30; 20 INJECTION, SOLUTION INTRAVENOUS at 01:07

## 2025-01-01 RX ADMIN — OCTREOTIDE ACETATE 50 MCG/HR: 500 INJECTION, SOLUTION INTRAVENOUS; SUBCUTANEOUS at 06:07

## 2025-01-01 RX ADMIN — EPINEPHRINE 1 MG: 0.1 INJECTION, SOLUTION ENDOTRACHEAL; INTRACARDIAC; INTRAVENOUS at 09:07

## 2025-01-01 RX ADMIN — CALCIUM GLUCONATE 2 G: 20 INJECTION, SOLUTION INTRAVENOUS at 07:07

## 2025-01-01 RX ADMIN — PROPOFOL 50 MCG/KG/MIN: 10 INJECTION, EMULSION INTRAVENOUS at 06:07

## 2025-01-01 RX ADMIN — SODIUM BICARBONATE 100 MEQ: 84 INJECTION INTRAVENOUS at 07:07

## 2025-01-01 RX ADMIN — OCTREOTIDE ACETATE 50 MCG/HR: 500 INJECTION, SOLUTION INTRAVENOUS; SUBCUTANEOUS at 10:07

## 2025-01-01 RX ADMIN — INSULIN HUMAN 6.77 UNITS: 100 INJECTION, SOLUTION PARENTERAL at 02:07

## 2025-01-01 RX ADMIN — MUPIROCIN 1 G: 20 OINTMENT TOPICAL at 02:07

## 2025-07-27 PROBLEM — J96.02 ACUTE RESPIRATORY FAILURE WITH HYPOXIA AND HYPERCARBIA: Status: ACTIVE | Noted: 2025-01-01

## 2025-07-27 PROBLEM — J69.0 ASPIRATION PNEUMONIA: Status: ACTIVE | Noted: 2025-01-01

## 2025-07-27 PROBLEM — I46.9 CARDIAC ARREST: Status: ACTIVE | Noted: 2025-01-01

## 2025-07-27 PROBLEM — J96.01 ACUTE RESPIRATORY FAILURE WITH HYPOXIA AND HYPERCARBIA: Status: ACTIVE | Noted: 2025-01-01

## 2025-07-28 PROBLEM — F10.90 ALCOHOL USE: Status: ACTIVE | Noted: 2025-01-01

## 2025-07-28 PROBLEM — R74.01 TRANSAMINITIS: Status: ACTIVE | Noted: 2025-01-01

## 2025-07-28 PROBLEM — R79.89 ELEVATED TROPONIN: Status: ACTIVE | Noted: 2025-01-01

## 2025-07-28 PROBLEM — R57.9 SHOCK: Status: ACTIVE | Noted: 2025-01-01

## 2025-07-28 PROBLEM — Z71.89 GOALS OF CARE, COUNSELING/DISCUSSION: Status: ACTIVE | Noted: 2025-01-01

## 2025-07-28 PROBLEM — G93.1 ANOXIC BRAIN INJURY: Status: ACTIVE | Noted: 2025-01-01

## 2025-07-28 PROBLEM — I49.01 VENTRICULAR FIBRILLATION: Status: ACTIVE | Noted: 2025-01-01

## 2025-07-28 PROBLEM — I46.9 CARDIAC ARREST: Chronic | Status: ACTIVE | Noted: 2025-01-01

## 2025-07-28 PROBLEM — E87.29 METABOLIC ACIDOSIS, INCREASED ANION GAP: Status: ACTIVE | Noted: 2025-01-01

## 2025-07-28 PROBLEM — G93.1 ANOXIC BRAIN INJURY: Chronic | Status: ACTIVE | Noted: 2025-01-01

## 2025-07-28 NOTE — ASSESSMENT & PLAN NOTE
Patient with Hypercapnic and Hypoxic Respiratory failure which is Acute.  he is not on home oxygen. Supplemental oxygen was provided and noted- Vent Mode: A/C  Oxygen Concentration (%):  [] 60  Resp Rate Total:  [26 br/min-34 br/min] 34 br/min  Vt Set:  [550 mL] 550 mL  PEEP/CPAP:  [5.8 cmH20-9.9 cmH20] 9.9 cmH20  Mean Airway Pressure:  [13 caW29-90 cmH20] 14 cmH20    .   Signs/symptoms of respiratory failure include- increased work of breathing and respiratory distress. Contributing diagnoses includes - Aspiration Labs and images were reviewed. Patient Has recent ABG, which has been reviewed. Will treat underlying causes and adjust management of respiratory failure as follows- continue mechanical ventilation   Pulmonology consulted   Repeat ABG   Repeat chest x-ray in the AM

## 2025-07-28 NOTE — ANESTHESIA PROCEDURE NOTES
Central Line    Diagnosis: Cardiac Arrest  Patient location during procedure: ICU  Procedure Urgency: Emergent  Procedure start time: 7/28/2025 1:26 AM  Timeout: 7/28/2025 1:25 AM  Procedure end time: 7/28/2025 1:45 AM      Staffing  Authorizing Provider: Alan Richard MD  Performing Provider: Alan Richard MD    Staffing  Performed by: Alan Richard MD  Authorized by: Alan Richard MD    Anesthesiologist was present at the time of the procedure.  Preanesthetic Checklist  Completed: patient identified, IV checked, site marked, risks and benefits discussed, monitors and equipment checked, timeout performed and anesthesia consent given  Indication   Indication: hemodynamic monitoring, vascular access, med administration     Anesthesia   local infiltration    Central Line   Skin Prep: skin prepped with ChloraPrep, skin prep agent completely dried prior to procedure  Sterile Barriers Followed: Yes    All five maximal barriers used- gloves, gown, cap, mask, and large sterile sheet    hand hygiene performed prior to central venous catheter insertion  Location: right internal jugular.   Catheter type: Triple Lumen  Catheter Size: 7 Fr  Inserted Catheter Length: 20 cm  Ultrasound: vascular probe with ultrasound   Vessel Caliber: medium, patent, compressibility normal  Needle advanced into vessel with real time Ultrasound guidance.     Manometry: Venous cannualation confirmed by visual estimation of blood vessel pressure using manometry.  Insertion Attempts: 1   Securement:line sutured, sterile dressing applied and blood return through all ports    Post-Procedure   X-Ray: no pneumothorax on x-ray, tip termination, placement verified by x-ray and successful placement   Adverse Events:none      Guidewire    Additional Notes  The chest x-ray was obtained after the procedure in the ICU. The central line is well placed with its tip overlying the superior vena cava and no pneumothorax.

## 2025-07-28 NOTE — CONSULTS
Pulmonary/Critical Care Consult      PATIENT NAME: Huber Burciaga  MRN: [id number]  TODAY'S DATE: 2025  11:45 AM  ADMIT DATE: 2025  AGE: 49 y.o. : 1975    CONSULT REQUESTED BY: Phyllis Taylor MD    REASON FOR CONSULT:   Cardiac arrest    HPI:   is a 49-year-old man who has a known history of heavy alcohol use, presented with cardiac arrest after being found unresponsive in the kitchen floor by his family.  He received bystander CPR for about 15-20 minutes prior to EMS arrival, where they continued to performed CPR.  Patient was found to be in V fib.  The patient was     Intubated and brought to the emergency room by EMS.      The patient's family reports that he has a pretty strong history of heavy alcohol use.    Review of Systems   Unable to perform ROS: Intubated           ALLERGIES  Review of patient's allergies indicates:  Not on File    INPATIENT SCHEDULED MEDICATIONS   calcium gluconate IVPB  1 g Intravenous Once    enoxparin  40 mg Subcutaneous Q24H (prophylaxis, 1700)    lactated ringers  1,000 mL Intravenous Once    mupirocin   Nasal BID    pantoprazole  40 mg Intravenous Daily    piperacillin-tazobactam (Zosyn) IV (PEDS and ADULTS) (extended infusion is not appropriate)  4.5 g Intravenous Q8H      amiodarone in dextrose 5%  0.5 mg/min Intravenous Continuous   Held at 25 0645    fentanyl  0-250 mcg/hr Intravenous Continuous 17.5 mL/hr at 25 1059 175 mcg/hr at 25 1059    lactated ringers   Intravenous Continuous        NORepinephrine bitartrate-D5W  0-3 mcg/kg/min Intravenous Continuous 7.9 mL/hr at 25 1059 0.25 mcg/kg/min at 25 1059    propofoL  0-50 mcg/kg/min Intravenous Continuous 20.3 mL/hr at 25 1126 50 mcg/kg/min at 25 1126    sodium bicarbonate 150 mEq in D5W 1,000 mL infusion   Intravenous Continuous 150 mL/hr at 25 1059 Rate Verify at 25 1059    vasopressin  0.04 Units/min Intravenous Continuous 12 mL/hr  at 07/28/25 1059 0.04 Units/min at 07/28/25 1059       MEDICAL AND SURGICAL HISTORY  No past medical history on file.  No past surgical history on file.    ALCOHOL, TOBACCO AND DRUG USE  Tobacco Use History[1]  Social History     Substance and Sexual Activity   Alcohol Use Not on file     Social History     Substance and Sexual Activity   Drug Use Not on file       FAMILY HISTORY  No family history on file.    VITAL SIGNS (MOST RECENT)  Temp: 97.3 °F (36.3 °C) (07/28/25 1101)  Pulse: 67 (07/28/25 1115)  Resp: 17 (07/28/25 1115)  BP: (!) 91/52 (07/28/25 1000)  SpO2: 95 % (07/28/25 1115)    INTAKE AND OUTPUT (LAST 24 HOURS):  Intake/Output Summary (Last 24 hours) at 7/28/2025 1145  Last data filed at 7/28/2025 1130  Gross per 24 hour   Intake 5965.08 ml   Output 2240 ml   Net 3725.08 ml       WEIGHT  Wt Readings from Last 1 Encounters:   07/27/25 67.7 kg (149 lb 4 oz)       Physical Exam  Vitals reviewed.   Constitutional:       General: He is in acute distress.      Appearance: He is well-developed. He is ill-appearing, toxic-appearing and diaphoretic.   HENT:      Head: Normocephalic and atraumatic.      Mouth/Throat:      Pharynx: No oropharyngeal exudate or posterior oropharyngeal erythema.   Eyes:      General: No scleral icterus.     Pupils: Pupils are equal, round, and reactive to light.   Neck:      Vascular: No JVD.   Cardiovascular:      Rate and Rhythm: Normal rate and regular rhythm.      Heart sounds: Normal heart sounds. No murmur heard.  Pulmonary:      Effort: Pulmonary effort is normal. No respiratory distress.      Breath sounds: Rales present. No wheezing.   Abdominal:      General: Bowel sounds are normal. There is distension.      Palpations: Abdomen is soft.      Tenderness: There is no abdominal tenderness.   Musculoskeletal:         General: No swelling.      Cervical back: Normal range of motion and neck supple. No rigidity.   Skin:     General: Skin is warm.      Capillary Refill: Capillary  "refill takes less than 2 seconds.      Coloration: Skin is pale.      Findings: No rash.   Neurological:      General: No focal deficit present.      Mental Status: He is alert and oriented to person, place, and time.      Cranial Nerves: No cranial nerve deficit.      Motor: No weakness or abnormal muscle tone.           ACUTE PHASE REACTANT (LAST 24 HOURS)  No results for input(s): "FERRITIN", "CRP", "LDH", "DDIMER" in the last 24 hours.    CBC LAST (LAST 24 HOURS)  Recent Labs   Lab 07/28/25  0411   WBC 17.55*   RBC 5.50   HGB 17.4   HCT 51.1   MCV 93   MCH 31.6*   MCHC 34.1   RDW 13.2      MPV 9.7   NRBC 0       CHEMISTRY LAST (LAST 24 HOURS)  Recent Labs   Lab 07/27/25  2143 07/27/25  2220 07/28/25  0116 07/28/25  0211 07/28/25  0411 07/28/25  0654 07/28/25  0821     --  136  --  141  141  --  143   K 4.1  --  6.7*  --  3.7  3.7  --  3.1*     --  101  --  101  101  --  101   CO2 22*  --  13*  --  15*  15*  --  21*   ANIONGAP 19*  --  22*  --  25*  25*  --  21*   BUN 14  --  20  --  22*  22*  --  27*   CREATININE 1.4  --  1.7*  --  2.3*  2.3*  --  3.2*   *  --  164*  --  250*  250*  --  201*   CALCIUM 8.1*  --  7.7*  --  8.0*  8.0*  --  7.6*   PH  --    < >  --    < >  --  7.291*  --    MG 2.9*  --   --   --   --   --   --    ALBUMIN 3.7  --  4.0  --  3.5  --   --    PROT 6.0  --  6.8  --  5.8*  --   --    ALKPHOS 91  --  231*  --  196*  --   --    *  --  820*  --  811*  --   --    *  --  681*  --   --   --   --    BILITOT 0.3  --  0.9  --  1.5*  --   --     < > = values in this interval not displayed.       COAGULATION LAST (LAST 24 HOURS)  Recent Labs   Lab 07/27/25 2143   INR 1.1       CARDIAC PROFILE (LAST 24 HOURS)  Recent Labs   Lab 07/27/25 2143   *       LAST 7 DAYS MICROBIOLOGY   Microbiology Results (last 7 days)       Procedure Component Value Units Date/Time    Culture, Respiratory with Gram Stain [[id number]] Collected: 07/28/25 0411    Order " Status: Completed Specimen: Respiratory from Sputum Updated: 07/28/25 0454     GRAM STAIN (RESPIRATORY) <10 Epithelial Cells/LPF      Few WBC seen      Many Gram positive cocci      Few Gram Negative Rods      Rare Yeast            MOST RECENT IMAGING  X-Ray Chest AP Portable  Narrative: CLINICAL HISTORY:  ([id number])50 y/o  (1975) M    sob;    TECHNIQUE:  (A#[id number], exam time 7/28/2025 4:57)    XR CHEST AP PORTABLE [id number]    COMPARISON:  Radiograph from 07/28/2025 it 155    FINDINGS:  The lungs show a small discoid airspace opacity in the left lower lobe compatible with mucous plugging, atelectasis and or aspiration.  Costophrenic angles are seen without effusion. No pneumothorax is identified. The heart is normal in size. The mediastinum is within normal limits. Osseous structures appear within normal limits. The visualized upper abdomen is unremarkable.    Lines and tubes: There is an endotracheal tube with tip projecting 4.8 cm from the wisam.  There is an enteric tube with tip below the field of view (subdiaphragmatic).  There is a right-sided IJ central venous catheter with tip at the level of the SVC.  Impression: Unchanged radiograph of the chest when accounting for differences in imaging technique.    Electronically signed by: Petr Ardon  Date:    07/28/2025  Time:    06:29  CT Abdomen Pelvis With IV Contrast NO Oral Contrast  Narrative: EXAM:  CT ABDOMEN PELVIS WITH IV CONTRAST    HISTORY:  GI bleed.    TECHNIQUE:  CT abdomen and pelvis was performed with intravenous administration.  Coronal and sagittal reformats were generated and reviewed.    All CT scans are performed using dose optimization techniques as appropriate to a performed exam including automated exposure control and/or standardized protocols for targeted exams where dose is matched to indication/reason for exam/patient size.    COMPARISON:  None.    FINDINGS:  Liver:Normal.  Gallbladder and Biliary system:The bladder is  nearly empty. Gallbladder wall is densely calcified.  Spleen:Normal.  Pancreas:Expansile heterogeneous appearance of the pancreatic head/body region is seen with mild surrounding inflammation.  Adrenals:Normal.  Kidneys and Ureters:Normal.  Bowel:No bowel obstruction. Rectal tube with inflated balloon seen. Appendix is normal. No evidence of arterial bleed.   Bladder:Neil catheter is in place.  Reproductive:Normal.  Peritoneum and Retroperitoneum: No free fluid or air.  Vessels: Portal vein and splenic vein are patent. The abdominal aorta is normal diameter.  Lymphatic:No mesenteric or retroperitoneal adenopathy.  Bone: Normal.  Soft tissues: Normal.  Impression: IMPRESSION:   1.  No evidence of arterial bleed.  2.  Expansile heterogeneous appearance of the pancreatic head/body region with mild surrounding inflammation suggests pancreatitis. Underlying mass cannot be excluded this time.  3.  Porcelain gallbladder.  4.  No bowel obstruction. Normal appendix.    -Electronically Signed By: Rina Strong DO   -Electronically Signed On:  7/28/2025 4:47 AM    Report Ends  CTA Chest Non-Coronary (PE Studies)  Narrative: EXAM: CTA CHEST    HISTORY: Male, age 49 years, Pulmonary embolism (PE) suspected, unknown D-dimer.    TECHNIQUE: CT angiography of the chest was performed after the administration of a bolus of intravenous contrast. Coronal and sagittal reformats and 3D reconstruction were done and reviewed.    All CT scans are performed using dose optimization techniques as appropriate to a performed exam including automated exposure control and/or standardized protocols for targeted exams where dose is matched to indication/reason for exam/patient size.    COMPARISON: None.    FINDINGS:   Vasculature: There is good opacification of the pulmonary arteries without revealing a large central filling defect or abrupt cut off of peripheral enhancement.  Thoracic aorta and main pulmonary artery are normal  diameter.    Lungs: Platelike atelectasis in the left lower lobe seen. Calcified granuloma in the left upper lobe seen. Tiny pulmonary nodule in the right middle lobe measures 4 mm. Additional prominent pulmonary nodule in the lateral aspect of the right lower lobe also seen. No consolidation. No cystic parenchymal changes.   Airways: Intraluminal filling defect within the proximal portion of the left lower lobe pulmonary bronchus is seen. The large airways are patent.  Pleura: No pleural effusion or pneumothorax.  Mediastinum: The heart is normal in size. There is no pericardial effusion. There is no mediastinal or hilar lymphadenopathy. The esophagus and visualized thyroid gland appear normal.  Bone: No fractures or suspicious osseus lesions.   Soft tissue: Normal.  Impression: IMPRESSION:  1.  No evidence of pulmonary embolism or acute aortic syndrome.  2.  Left lower lobe platelike atelectasis with intraluminal filling defect in the proximal left lower lobe pulmonary bronchus. This may be secondary to mucous plugging or aspiration. Consider bronchoscopy.  3.  Prior granulomatous disease.  4.  Right middle lobe and right lower lobe pulmonary nodules, 4 mm. No follow-up needed if patient is low-risk (and has no known or suspected primary neoplasm). Non-contrast chest CT can be considered in 12 months if patient is high-risk. (Fleischner society recommendation).    -Electronically Signed By: Rina Strong DO   -Electronically Signed On:  7/28/2025 4:40 AM    Report Ends  X-Ray Chest AP Portable  Narrative: EXAMINATION:  XR CHEST AP PORTABLE    CLINICAL HISTORY:  line placement;    TECHNIQUE:  Single frontal view of the chest was performed.    COMPARISON:  Radiograph of the chest from 07/27/2025.    FINDINGS:  Heart size and pulmonary vasculature are within normal limits.  An endotracheal tube is present which terminates superior to the wisam.  A right IJ central venous catheter is present which terminates in  the superior vena cava.  And esophageal gastric tube is present which terminates below the field of view.  No evidence of focal consolidation, pneumothorax, or pleural effusion.  No evidence of acute osseous process.  Impression: No evidence of acute cardiopulmonary process.    Electronically signed by: Jin Castro  Date:    07/28/2025  Time:    02:01  CT Cervical Spine Without Contrast  Narrative: EXAMINATION:  CT CERVICAL SPINE WITHOUT CONTRAST    CLINICAL HISTORY:  Found down;    TECHNIQUE:  Low dose axial images, sagittal and coronal reformations were performed though the cervical spine.  Contrast was not administered.    COMPARISON:  None available.    FINDINGS:  No evidence of acute fracture or subluxation involving the cervical spine.  Mild multilevel chronic degenerative changes are present involving the cervical spine.  The paraspinal soft tissues are unremarkable.  Impression: No evidence of acute fracture or subluxation involving the cervical spine.    Electronically signed by: Jin Castro  Date:    07/28/2025  Time:    00:23  CT Head Without Contrast  Narrative: EXAMINATION:  CT HEAD WITHOUT CONTRAST    CLINICAL HISTORY:  Mental status change, unknown cause;    TECHNIQUE:  Low dose axial CT images obtained throughout the head without intravenous contrast. Sagittal and coronal reconstructions were performed.    COMPARISON:  None available.    FINDINGS:  Intracranial compartment:    There is sulcal effacement and crowding and subarachnoid cisterns and questionable loss of gray-white differentiation involving the cerebral cortex.  No extra-axial blood or fluid collections.    No parenchymal mass, hemorrhage, edema or major vascular distribution infarct.    Skull/extracranial contents (limited evaluation):    No fracture. Mastoid air cells and paranasal sinuses are essentially clear.  The orbits are unremarkable.  Impression: Findings concerning for diffuse hypoxic ischemic brain injury.    This  critical information above was relayed by myself  by telephone to Dr. Yancey on 07/28/2025 at 00:10.    CRITICAL FINDINGS:  Intracranial ischemia    RECOMMENDATIONS:  Consult/Clinical Evaluation    Electronically signed by: Jin Castro  Date:    07/28/2025  Time:    00:20      CURRENT VISIT EKG  Results for orders placed or performed during the hospital encounter of 07/27/25   EKG 12-lead   Result Value Ref Range    QRS Duration 84 ms    [facility] QTC Calculation 434 ms    Narrative    Test Reason : I46.9,    Vent. Rate : 103 BPM     Atrial Rate : 103 BPM     P-R Int : 120 ms          QRS Dur :  84 ms      QT Int : 332 ms       P-R-T Axes :  63 109  70 degrees    QTcB Int : 434 ms    Sinus tachycardia  Rightward axis  Borderline Abnormal ECG  No previous ECGs available  Confirmed by Liban Blue ([id number]) on 7/28/2025 11:38:15 AM    Referred By: AAAREFERRAL SELF           Confirmed By: Liban Blue       ECHOCARDIOGRAM RESULTS  No results found for this or any previous visit.        VENTILATOR INFORMATION  Vent Mode: A/C  Oxygen Concentration (%):  [] 40  Resp Rate Total:  [18 br/min-41 br/min] 18 br/min  Vt Set:  [450 mL-550 mL] 450 mL  PEEP/CPAP:  [5 cmH20-9.9 cmH20] 5 cmH20  Pressure Support:  [10 cmH20] 10 cmH20  Mean Airway Pressure:  [-7.2 wjH63-23 cmH20] 8 cmH20           LAST ARTERIAL BLOOD GAS  ABG  Recent Labs   Lab 07/28/25  0410 07/28/25  0654   PH 7.162* 7.291*   PO2 135* 139*   PCO2 34.6* 38.8   HCO3 12.4* 18.7   BE -16*  --        ASSESSMENT:   Cardiac arrest- initial proceeding rhythm thought to be VFib  Respiratory failure  Shock- hypovolemic and distributive, possible additional features of septic  Acute pancreatitis  Alcoholic hepatitis  Anion gap metabolic acidosis  Acute renal failure  Acute GI bleed      Presents with cardiac arrest.  Does not appear to be related to an acute coronary event.  Current differential is acute pancreatitis, electrolyte disturbance with arrhythmia, acute GI  bleed, acute intoxication from alcohol, or some combination of these differentials.  Aspiration seems to be less likely at this time as I do not see evidence of pneumonitis on his CT. CT abdomen shows no evidence of acute bleeding.   CT head is concerning for potential anoxic brain injury.    Extensive discussion with the family at the bedside, in particular the mother of the patient as well as his ex wife are both present along with other family members.  I explained that he is extremely high risk for further decompensation.  I am not certain he will survive the next 24-48 hours.  Our goal will be to stabilize for now, plan on aggressive fluid resuscitation and treatment of pancreatitis monitoring of blood counts and management of acidosis.  If we are able to stabilize he may be a candidate for GI endoscopy but at this time does not currently safe.    PLAN:   Neuro  - continue current sedation, we will plan on continuing spontaneous mode as he is more synchronous with the ventilator.  If we are not achieving appropriate levels a respiratory compensation to the metabolic acidosis, then we will need to switch back to a volume control mode, and I think we should add additional sedation including ketamine  - we will need neuro prognostication and next 48-72 hours    Respiratory:  - continue current vent settings, need to compensate for metabolic acidosis, repeat ABG    Cardiac  - continue map goal 65, continue Levophed and vaso    Renal  - trend urine output, aggressive fluid resuscitation, monitor urine output, continue Neil      FENGI  - replace electrolytes as needed  - early tube feeding is typically indicated in cases of acute pancreatitis, we will consult nutrition and likely start tomorrow morning  - I we will need to assess for ascites, there was not a significant amount on CT abdomen, we will also need a liver ultrasound to assess for cirrhosis  - continue NG tube to suction   - appropriate access, he has at  least 2 18 guages     Heme  - holding heparin ppx due to oozing GI blled  - GI Consultation if we are able to stabilize   - 40mg PPI increased to BID, continue octreotide,   - montior CBC- q6h for today   - INR is ok- conitnue daily INR monitoring      Estiven Mar MD  Date of Service: 07/28/2025  11:45 AM      Upon my evaluation, this patient had a high probability of imminent or life-threatening deterioration, which required my direct attention, intervention, and personal management.    I have personally provided 42 minutes of critical care time exclusive of time spent on separately billable procedures. Over 50% of the time of this encounter was spent in direct care at the bedside. Time includes review of laboratory data, radiology results, discussion with consultants, and monitoring for potential decompensation. Interventions were performed as documented above.         [1]   Social History  Tobacco Use   Smoking Status Not on file   Smokeless Tobacco Not on file

## 2025-07-28 NOTE — ACP (ADVANCE CARE PLANNING)
Advance Care Planning     Date: 07/28/2025    Discussed with family member who is patient's cousin at bedside.  Cousin reports that patient has a son who is 12 years old, patient is .  Patient is not close with parents.  Discuss goals of care with the cousin, would like to continue with current treatment.  Full code. Cousin to notify rest of family.     Length of ACP   conversation in minutes: 15 minutes

## 2025-07-28 NOTE — CARE UPDATE
07/28/25 0654   Patient Assessment/Suction   Level of Consciousness (AVPU) unresponsive   Respiratory Effort Labored   Expansion/Accessory Muscles/Retractions accessory muscle use;abdominal muscle use   All Lung Fields Breath Sounds coarse   Rhythm/Pattern, Respiratory assisted mechanically;tachypneic   Cough Frequency no cough   Cough Type assisted   Suction Method tracheal   Suction Pressure (mmHg) -120 mmHg   $ Suction Charges Inline Suction Procedure Stat Charge   Secretions Amount moderate   Skin Integrity   $ Wound Care Tech Time 15 min   Area Observed Left;Right;Cheek   Skin Appearance without discoloration   PRE-TX-O2   Device (Oxygen Therapy) ventilator   $ Is the patient on High Flow Oxygen? Yes   Oxygen Concentration (%) 40   SpO2 99 %   Pulse Oximetry Type Continuous   $ Pulse Oximetry - Multiple Charge Pulse Oximetry - Multiple   Pulse 92   Resp (!) 36        Airway - Non-Surgical 07/27/25   Placement Date: 07/27/25   Present Prior to Hospital Arrival?: Yes  Inserted by: EMS  Airway Device Size: 7.0  Style: Cuffed  Cuffed: Cuffed  Cuff Inflated With: Air  Depth of Insertion (cm): 25  Secured at: Teeth   Secured at 25 cm   Measured At Teeth   Secured Location Right   Secured by Commercial tube cordova   Bite Block secure and patent;left   Site Condition Cool;Dry   Status Intact;Secured   Site Assessment Clean;Dry   Cuff Volume Green Zone 18 - 26 cmH20   Vent Select   Conventional Vent Y   Charged w/in last 24h YES   Preset Conventional Ventilator Settings   Vent ID [id number]   Vent Type    Ventilation Type VC   Vent Mode A/C   Humidity HME   Set Rate 28 BPM   Vt Set 450 mL   PEEP/CPAP 5 cmH20   Peak Flow 60 L/min   Peak End Inspiratory Pressure -9.4 cmH20   Plateau Set/Insp. Hold (sec) 0   I-Trigger Type  V-TRIG   Trigger Sensitivity Flow/I-Trigger 5 L/min   Patient Ventilator Parameters   Resp Rate Total 37 br/min   Peak Airway Pressure 11 cmH20   Mean Airway Pressure 9.9 cmH20   Plateau Pressure 0  cmH20   Exhaled Vt 813 mL   Total Ve 18.7 L/m   I:E Ratio Measured 1:1.6   Auto PEEP 0 cmH20   Tubing ID (mm) 7 mm   Tube Type ET   Conventional Ventilator Alarms   Alarms On Y   Ve High Alarm 23 L/min   Ve Low Alarm 3 L/min   Vt High Alarm 1200 mL   Vt Low Alarm 200 mL   Resp Rate High Alarm 45 br/min   Apnea Rate 26   Apnea Volume (mL) 500 mL   Apnea Oxygen Concentration  100   Apnea Flow Rate (L/min) 50   T Apnea 20 sec(s)   IHI Ventilator Associated Pneumonia Bundle (Required)   Daily Awakening Trials Performed No   Daily Assessment of Readiness to Extubate Yes   Head of Bed Elevated  HOB 30   Oral Care Mouth suctioned   Peptic Ulcer ProphylaxisProvided Peptic ulcer prophylaxis maintained   Vent Circut Breaks Minimized Yes   VTE Prophylaxis Provided VTE prophylaxis maintained   Ready to Wean/Extubation Screen   FIO2<=50 (chart decimal) 0.4   MV<16L (chart vol.) (!) 18.7   PEEP <=8 (chart #) 5   Ready to Wean Parameters   F/VT Ratio<105 (RSBI) (!) 44.28   Education   $ Education 15 min;Ventilator Oxygen

## 2025-07-28 NOTE — PLAN OF CARE
Problem: Infection  Goal: Absence of Infection Signs and Symptoms  Outcome: Progressing     Problem: Adult Inpatient Plan of Care  Goal: Plan of Care Review  Outcome: Progressing  Goal: Patient-Specific Goal (Individualized)  Outcome: Progressing  Goal: Absence of Hospital-Acquired Illness or Injury  Outcome: Progressing  Goal: Optimal Comfort and Wellbeing  Outcome: Progressing  Goal: Readiness for Transition of Care  Outcome: Progressing     Problem: Fall Injury Risk  Goal: Absence of Fall and Fall-Related Injury  Outcome: Progressing     Problem: Restraint, Nonviolent  Goal: Absence of Harm or Injury  Outcome: Progressing     Problem: Coping Ineffective  Goal: Effective Coping  Outcome: Progressing     Problem: Wound  Goal: Optimal Coping  Outcome: Progressing  Goal: Optimal Functional Ability  Outcome: Progressing  Goal: Absence of Infection Signs and Symptoms  Outcome: Progressing  Goal: Optimal Pain Control and Function  Outcome: Progressing  Goal: Skin Health and Integrity  Outcome: Progressing  Goal: Optimal Wound Healing  Outcome: Progressing     Problem: Skin Injury Risk Increased  Goal: Skin Health and Integrity  Outcome: Progressing     Problem: Neurologic Impairment  Goal: Optimal Adjustment to Neurologic Impair  Outcome: Progressing  Goal: Effective Bowel Elimination/Continence  Outcome: Progressing

## 2025-07-28 NOTE — ASSESSMENT & PLAN NOTE
I reviewed the patient's chart and discussed the case with the patient's team.      I examined Huber Burciaga at bedside.  The patient lacks capacity for complex medical decision-making.  No family present.     He is currently admitted to the ICU.  He is critically ill due to multiorgan failure.    At this point his prognosis is guarded.  Given his multiorgan failure he is at a very high risk of dying during this hospitalization.  In the very best case scenario he will have many weeks and more likely months of recovery ahead.    We will continue to reach out to family to explore his wishes and values.    Per chart review he is not  and he does not have any grown children.  His mother is listed in the chart.  If this information is accurate she should act as his surrogate decisionmaker per my understanding of [place] statutes. In [place] the hierarchy is as follows (if that individual does not desire to act as patient's surrogate, then proceed down the hierarchy. Any individual can defer their role as surrogate, but they can not designate another surrogate; only the patient can designate a specific surrogate):  - Any adult for himself  - Judicially appointed  or  if one has been appointed  - An agent acting pursuant to a valid mandate (HPOA)  - Patient's spouse not judicially   - Adult children  - Any parent  - Patient's sibling  - Patient's other ascendants or descendants  - Adult friend    We will continue to follow. I hope we can be helpful.

## 2025-07-28 NOTE — SUBJECTIVE & OBJECTIVE
Interval History:  See HPI    No past medical history on file.    No past surgical history on file.    Review of patient's allergies indicates:  Not on File    Medications:  Continuous Infusions:   amiodarone in dextrose 5%  0.5 mg/min Intravenous Continuous   Held at 07/28/25 0645    fentanyl  0-250 mcg/hr Intravenous Continuous 15 mL/hr at 07/28/25 1640 150 mcg/hr at 07/28/25 1640    lactated ringers   Intravenous Continuous 250 mL/hr at 07/28/25 1640 Rate Verify at 07/28/25 1640    NORepinephrine bitartrate-D5W  0-3 mcg/kg/min Intravenous Continuous 15.2 mL/hr at 07/28/25 1640 0.48 mcg/kg/min at 07/28/25 1640    octreotide (SANDOSTATIN) 500 mcg in 0.9% NaCl 100 mL infusion  50 mcg/hr Intravenous Continuous 10 mL/hr at 07/28/25 1640 50 mcg/hr at 07/28/25 1640    propofoL  0-50 mcg/kg/min Intravenous Continuous 20.3 mL/hr at 07/28/25 1640 50 mcg/kg/min at 07/28/25 1640    sodium bicarbonate 150 mEq in D5W 1,000 mL infusion   Intravenous Continuous 150 mL/hr at 07/28/25 1640 Rate Verify at 07/28/25 1640    vasopressin  0.04 Units/min Intravenous Continuous 12 mL/hr at 07/28/25 1640 0.04 Units/min at 07/28/25 1640     Scheduled Meds:   calcium gluconate IVPB  1 g Intravenous Once    mupirocin   Nasal BID    pantoprazole  40 mg Intravenous BID    piperacillin-tazobactam (Zosyn) IV (PEDS and ADULTS) (extended infusion is not appropriate)  4.5 g Intravenous Q8H     PRN Meds:  Current Facility-Administered Medications:     calcium gluconate IVPB, 1 g, Intravenous, PRN    calcium gluconate IVPB, 2 g, Intravenous, PRN    calcium gluconate IVPB, 3 g, Intravenous, PRN    calcium gluconate IVPB, 1 g, Intravenous, Once **AND** calcium gluconate IVPB, 1 g, Intravenous, Q10 Min PRN    dextrose 50%, 12.5 g, Intravenous, PRN    [COMPLETED] dextrose 50%, 50 g, Intravenous, Once **AND** dextrose 50%, 25 g, Intravenous, PRN **AND** [COMPLETED] insulin regular, 0.1 Units/kg, Intravenous, Once    glucagon (human recombinant), 1 mg,  Intramuscular, PRN    insulin aspart U-100, 0-10 Units, Subcutaneous, Q6H PRN    magnesium sulfate 2 g IVPB, 2 g, Intravenous, PRN    magnesium sulfate 2 g IVPB, 2 g, Intravenous, PRN    potassium chloride, 40 mEq, Intravenous, PRN **AND** potassium chloride, 40 mEq, Intravenous, PRN **AND** potassium chloride, 40 mEq, Intravenous, PRN    sodium chloride 0.9%, 10 mL, Intravenous, PRN    sodium phosphate 15 mmol in D5W 250 mL IVPB, 15 mmol, Intravenous, PRN    sodium phosphate 20.1 mmol in D5W 250 mL IVPB, 20.1 mmol, Intravenous, PRN    sodium phosphate 30 mmol in D5W 250 mL IVPB, 30 mmol, Intravenous, PRN    Family History    None       Tobacco Use    Smoking status: Not on file    Smokeless tobacco: Not on file   Substance and Sexual Activity    Alcohol use: Not on file    Drug use: Not on file    Sexual activity: Not on file       Review of Systems   Unable to perform ROS: Intubated     Objective:     Vital Signs (Most Recent):  Temp: 97.1 °F (36.2 °C) (on blanketrol, temperature management system) (07/28/25 1501)  Pulse: (!) 111 (07/28/25 1630)  Resp: (!) 28 (07/28/25 1630)  BP: (!) 91/52 (07/28/25 1000)  SpO2: (!) 94 % (07/28/25 1515) Vital Signs (24h Range):  Temp:  [94.4 °F (34.7 °C)-106 °F (41.1 °C)] 97.1 °F (36.2 °C)  Pulse:  [] 111  Resp:  [14-41] 28  SpO2:  [81 %-100 %] 94 %  BP: ()/() 91/52  Arterial Line BP: ()/(47-71) 76/65     Weight: 67.7 kg (149 lb 4 oz)  Body mass index is 22.69 kg/m².       Physical Exam  Vitals reviewed.   Constitutional:       General: He is not in acute distress.     Appearance: He is ill-appearing and toxic-appearing.   HENT:      Head: Normocephalic and atraumatic.      Right Ear: External ear normal.      Left Ear: External ear normal.      Nose: Nose normal. No congestion.      Mouth/Throat:      Pharynx: No oropharyngeal exudate.   Eyes:      General:         Right eye: No discharge.         Left eye: No discharge.   Cardiovascular:      Rate and  Rhythm: Tachycardia present.   Pulmonary:      Comments: Orally intubated and mechanically ventilated  Abdominal:      Palpations: Abdomen is soft.   Neurological:      Comments: Sedated.  Per nursing his pupils are fixed and he is without cough reflex   Psychiatric:      Comments: Sedated and unable to assess            Review of Symptoms      Symptom Assessment (ESAS 0-10 Scale)  Unable to complete assessment due to Patient nonverbal         Pain Assessment in Advanced Demential Scale (PAINAD)   Breathing - Independent of vocalization:  0  Negative vocalization:  0  Facial expression:  0  Body language:  0  Consolability:  0  Total:  0    Performance Status:  10    Psychosocial/Cultural:   See Palliative Psychosocial Note: No  **Primary  to Follow**  Palliative Care  Consult: No        Advance Care Planning   Advance Directives:     Decision Making:  Patient unable to communicate due to disease severity/cognitive impairment  Goals of Care: No family present.  Unable to determine at this time.         Significant Labs: BMP:   Recent Labs   Lab 07/27/25 2143 07/28/25 0116 07/28/25  1050   *   < > 196*      < > 143   K 4.1   < > 2.8*      < > 100   CO2 22*   < > 20*   BUN 14   < > 29*   CREATININE 1.4   < > 3.7*   CALCIUM 8.1*   < > 7.5*   MG 2.9*  --   --     < > = values in this interval not displayed.     CBC:   Recent Labs   Lab 07/28/25 0116 07/28/25  0211 07/28/25 0410 07/28/25  0411 07/28/25  1050   WBC 20.86*  --   --  17.55* 10.76   HGB 18.4*  --   --  17.4 17.3   HCT 55.0*   < > 53 51.1 49.4     --   --  222 158    < > = values in this interval not displayed.     CBC:   Recent Labs   Lab 07/28/25  1050   WBC 10.76   HGB 17.3   HCT 49.4   MCV 92        BMP:  Recent Labs   Lab 07/27/25 2143 07/28/25 0116 07/28/25  1050   *   < > 196*      < > 143   K 4.1   < > 2.8*      < > 100   CO2 22*   < > 20*   BUN 14   < > 29*    CREATININE 1.4   < > 3.7*   CALCIUM 8.1*   < > 7.5*   MG 2.9*  --   --     < > = values in this interval not displayed.     LFT:  Lab Results   Component Value Date    AST  07/28/2025      Comment:      2+ Lipemia; Error in reaction rate for rate assay. Unable to calculate result.      (H) 07/28/2025    ALKPHOS 196 (H) 07/28/2025    BILITOT 1.5 (H) 07/28/2025     Albumin:   Albumin   Date Value Ref Range Status   07/28/2025 3.5 3.5 - 5.2 g/dL Final     Protein:   Protein Total   Date Value Ref Range Status   07/28/2025 5.8 (L) 6.0 - 8.4 gm/dL Final     Lactic acid:   Lab Results   Component Value Date    LACTATE 15.7 (HH) 07/28/2025    LACTATE 13.2 (HH) 07/28/2025       Significant Imaging: I have reviewed all pertinent imaging results/findings within the past 24 hours.

## 2025-07-28 NOTE — CARE UPDATE
07/28/25 1101        Airway - Non-Surgical 07/27/25   Placement Date: 07/27/25   Present Prior to Hospital Arrival?: Yes  Inserted by: EMS  Airway Device Size: 7.0  Style: Cuffed  Cuffed: Cuffed  Cuff Inflated With: Air  Depth of Insertion (cm): 25  Secured at: Teeth   Secured at 25 cm   Measured At Lips   Secured Location Center   Secured by Commercial tube cordova   Site Condition Cool;Dry   Status Intact;Secured;Patent   Preset Conventional Ventilator Settings   Ventilation Type (S)  PC   Vent Mode (S)  Spont   Oxygen Concentration (%) 40   PEEP/CPAP 5 cmH20   Pressure Support 10 cmH20   Peak Flow 60 L/min   Peak End Inspiratory Pressure 17 cmH20   Insp Rise Time  70 %   I-Trigger Type  V-TRIG   Trigger Sensitivity Flow/I-Trigger 5 L/min   Patient Ventilator Parameters   Pulse 80   SpO2 (!) 85 %   Resp Rate Total 18 br/min   Peak Airway Pressure 17 cmH20   Mean Airway Pressure 9.5 cmH20   Plateau Pressure 0 cmH20   Total Ve 18.8 L/m   Spont Ve 18.8 L   I:E Ratio Measured 1:1.4   Auto PEEP 0 cmH20   Inspired Tidal Volume (VTI) 1 mL   Conventional Ventilator Alarms   Resp Rate High Alarm 45 br/min   Apnea Rate 26   Apnea Volume (mL) 500 mL   Apnea Oxygen Concentration  100   Apnea Flow Rate (L/min) 50   T Apnea 20 sec(s)   Ready to Wean/Extubation Screen   FIO2<=50 (chart decimal) 0.4   MV<16L (chart vol.) (!) 18.8   PEEP <=8 (chart #) 5   Ready to Wean Parameters   F/VT Ratio<105 (RSBI) (!) 32.37   Exhaled Vt 587 mL     Placed on spont. By Dr Mar

## 2025-07-28 NOTE — CONSULTS
[facility]  Department of Cardiology  Consult Note      PATIENT NAME: Huber Burciaga  MRN: [id number]  TODAY'S DATE: 07/28/2025  ADMIT DATE: 7/27/2025                          CONSULT REQUESTED BY: Phyllis Taylor MD    SUBJECTIVE     PRINCIPAL PROBLEM: Cardiac arrest      REASON FOR CONSULT:  Cardiac Arrest      HPI:    Per HPI Below  Patient is currently on 2 pressors, Sandostatin, Ventsupport, and bicarb drip      FROM H AND P     Cardiac Arrest       Family reports Pt with heavy drinking over the last 3-4 days was found down in his kitchen. Ems states no pulse on arrival at 2031, morris applied at 2032, Epi x 2 pt in vfib, pt shocked, epi x1. ROSC at 2053         HPI: Patient is a 49-year-old male with history of alcohol use presents to the emergency room for cardiac arrest.  Patient intubated and sedated on examination, history supplemented by ED provider.  Patient reportedly has been drinking heavily for the last 3-4 days, apparently this happens whenever patient's son returns back to his ex-wife.  Reportedly family member was outside for about 15 minutes and came back in and found the patient lying on the kitchen floor, unresponsive.  Per patient's cousin at bedside states a family member started CPR and called 911.  Upon arrival by EMS patient was pulseless, was given epi, had shockable rhythm and was shocked once.  ROSC achieved after about 20 minutes.  Patient again then had another cardiac arrest in the emergency room and ROCS was achieved shortly after.  Labs showed anion gap 19, glucose 218, Mag 2.9, elevated liver enzymes, elevated BNP and troponin, no ST elevations on EKG.  Initial ABG showed respiratory and metabolic acidosis.  Patient had blood-tinged output from OG tube.  Chest x-ray showing findings of possible pneumonia.  CT head, cervical spine, abdomen and pelvis and chest pending.     No past medical history on file.     No past surgical history on file.     Review of  patient's allergies indicates:  Not on File     No current facility-administered medications on file prior to encounter.      No current outpatient medications on file prior to encounter.      Family History    None        Review of patient's allergies indicates:  Not on File    No past medical history on file.  No past surgical history on file.  Social History[1]     REVIEW OF SYSTEMS    As mentioned in HPI    OBJECTIVE     VITAL SIGNS (Most Recent)  Temp: 99.2 °F (37.3 °C) (07/28/25 0950)  Pulse: 76 (07/28/25 1015)  Resp: (!) 22 (07/28/25 1015)  BP: (!) 91/52 (07/28/25 1000)  SpO2: 98 % (07/28/25 1000)    VENTILATION STATUS  Resp: (!) 22 (07/28/25 1015)  SpO2: 98 % (07/28/25 1000)  Vent Mode: A/C  Oxygen Concentration (%):  [] 40  Resp Rate Total:  [26 br/min-40 br/min] 36 br/min  Vt Set:  [450 mL-550 mL] 450 mL  PEEP/CPAP:  [5 cmH20-9.9 cmH20] 5 cmH20  Pressure Support:  [10 cmH20] 10 cmH20  Mean Airway Pressure:  [-7.2 hsP08-82 cmH20] 8 cmH20        I & O (Last 24H):  Intake/Output Summary (Last 24 hours) at 7/28/2025 1031  Last data filed at 7/28/2025 1004  Gross per 24 hour   Intake 5735.56 ml   Output 1535 ml   Net 4200.56 ml       WEIGHTS  Wt Readings from Last 3 Encounters:   07/27/25 2141 67.7 kg (149 lb 4 oz)       PHYSICAL EXAM    CONSTITUTIONAL: Resting on Ventilator  LUNGS: mechanical breath sounds  HEART: regular rate and rhythm, S1, S2 normal, no murmur   ABDOMEN: soft, non-tender, bowel sounds normal  EXTREMITIES: No edema  SKIN: No rash  PSYCH: normal affect      HOME MEDICATIONS:Medications Ordered Prior to Encounter[2]    SCHEDULED MEDS:   calcium gluconate IVPB  1 g Intravenous Once    enoxparin  40 mg Subcutaneous Q24H (prophylaxis, 1700)    mupirocin   Nasal BID    pantoprazole  40 mg Intravenous Daily    piperacillin-tazobactam (Zosyn) IV (PEDS and ADULTS) (extended infusion is not appropriate)  4.5 g Intravenous Q8H       CONTINUOUS INFUSIONS:   amiodarone in dextrose 5%  0.5 mg/min  Intravenous Continuous   Held at 07/28/25 0645    fentanyl  0-250 mcg/hr Intravenous Continuous 17.5 mL/hr at 07/28/25 1004 175 mcg/hr at 07/28/25 1004    NORepinephrine bitartrate-D5W  0-3 mcg/kg/min Intravenous Continuous 5.7 mL/hr at 07/28/25 1004 0.18 mcg/kg/min at 07/28/25 1004    propofoL  0-50 mcg/kg/min Intravenous Continuous 20.3 mL/hr at 07/28/25 1004 50 mcg/kg/min at 07/28/25 1004    sodium bicarbonate 150 mEq in D5W 1,000 mL infusion   Intravenous Continuous 150 mL/hr at 07/28/25 1004 Rate Verify at 07/28/25 1004    vasopressin  0.04 Units/min Intravenous Continuous 12 mL/hr at 07/28/25 1004 0.04 Units/min at 07/28/25 1004       PRN MEDS:  Current Facility-Administered Medications:     calcium gluconate IVPB, 1 g, Intravenous, PRN    calcium gluconate IVPB, 2 g, Intravenous, PRN    calcium gluconate IVPB, 3 g, Intravenous, PRN    calcium gluconate IVPB, 1 g, Intravenous, Once **AND** calcium gluconate IVPB, 1 g, Intravenous, Q10 Min PRN    dextrose 50%, 12.5 g, Intravenous, PRN    dextrose 50%, 25 g, Intravenous, PRN    [COMPLETED] dextrose 50%, 50 g, Intravenous, Once **AND** dextrose 50%, 25 g, Intravenous, PRN **AND** [COMPLETED] insulin regular, 0.1 Units/kg, Intravenous, Once    glucagon (human recombinant), 1 mg, Intramuscular, PRN    glucose, 16 g, Oral, PRN    glucose, 24 g, Oral, PRN    insulin aspart U-100, 0-5 Units, Subcutaneous, QID (AC + HS) PRN    magnesium sulfate 2 g IVPB, 2 g, Intravenous, PRN    magnesium sulfate 2 g IVPB, 2 g, Intravenous, PRN    potassium chloride, 40 mEq, Intravenous, PRN **AND** potassium chloride, 40 mEq, Intravenous, PRN **AND** potassium chloride, 40 mEq, Intravenous, PRN    sodium chloride 0.9%, 10 mL, Intravenous, PRN    sodium phosphate 15 mmol in D5W 250 mL IVPB, 15 mmol, Intravenous, PRN    sodium phosphate 20.1 mmol in D5W 250 mL IVPB, 20.1 mmol, Intravenous, PRN    sodium phosphate 30 mmol in D5W 250 mL IVPB, 30 mmol, Intravenous, PRN    LABS AND  "DIAGNOSTICS     CBC LAST 3 DAYS  Recent Labs   Lab 07/27/25 2143 07/28/25 0116 07/28/25 0211 07/28/25 0410 07/28/25 0411   WBC 10.06 20.86*  --   --  17.55*   RBC 4.67 6.01  --   --  5.50   HGB 14.4 18.4*  --   --  17.4   HCT 45.3 55.0* 56* 53 51.1   MCV 97 92  --   --  93   MCH 30.8 30.6  --   --  31.6*   MCHC 31.8* 33.5  --   --  34.1   RDW 12.5 13.1  --   --  13.2    247  --   --  222   MPV 10.1 9.0*  --   --  9.7   NRBC 1* 0  --   --  0       COAGULATION LAST 3 DAYS  Recent Labs   Lab 07/27/25 2143   INR 1.1       CHEMISTRY LAST 3 DAYS  Recent Labs   Lab 07/27/25 2143 07/27/25 2220 07/28/25 0116 07/28/25 0211 07/28/25 0410 07/28/25 0411 07/28/25  0654 07/28/25  0821     --  136  --   --  141  141  --  143   K 4.1  --  6.7*  --   --  3.7  3.7  --  3.1*     --  101  --   --  101  101  --  101   CO2 22*  --  13*  --   --  15*  15*  --  21*   ANIONGAP 19*  --  22*  --   --  25*  25*  --  21*   BUN 14  --  20  --   --  22*  22*  --  27*   CREATININE 1.4  --  1.7*  --   --  2.3*  2.3*  --  3.2*   *  --  164*  --   --  250*  250*  --  201*   CALCIUM 8.1*  --  7.7*  --   --  8.0*  8.0*  --  7.6*   PH  --    < >  --  7.064* 7.162*  --  7.291*  --    MG 2.9*  --   --   --   --   --   --   --    ALBUMIN 3.7  --  4.0  --   --  3.5  --   --    PROT 6.0  --  6.8  --   --  5.8*  --   --    ALKPHOS 91  --  231*  --   --  196*  --   --    *  --  820*  --   --  811*  --   --    *  --  681*  --   --   --   --   --    BILITOT 0.3  --  0.9  --   --  1.5*  --   --     < > = values in this interval not displayed.       CARDIAC PROFILE LAST 3 DAYS  Recent Labs   Lab 07/27/25  2143   *       ENDOCRINE LAST 3 DAYS  No results for input(s): "TSH", "PROCAL" in the last 168 hours.    LAST ARTERIAL BLOOD GAS  ABG  Recent Labs   Lab 07/28/25  0410 07/28/25  0654   PH 7.162* 7.291*   PO2 135* 139*   PCO2 34.6* 38.8   HCO3 12.4* 18.7   BE -16*  --        LAST 7 DAYS " MICROBIOLOGY   Microbiology Results (last 7 days)       Procedure Component Value Units Date/Time    Culture, Respiratory with Gram Stain [[id number]] Collected: 07/28/25 0411    Order Status: Completed Specimen: Respiratory from Sputum Updated: 07/28/25 0454     GRAM STAIN (RESPIRATORY) <10 Epithelial Cells/LPF      Few WBC seen      Many Gram positive cocci      Few Gram Negative Rods      Rare Yeast            MOST RECENT IMAGING  X-Ray Chest AP Portable  Narrative: CLINICAL HISTORY:  ([id number] y/o  (1975) M    sob;    TECHNIQUE:  (A#[id number], exam time 7/28/2025 4:57)    XR CHEST AP PORTABLE [id number]    COMPARISON:  Radiograph from 07/28/2025 it 155    FINDINGS:  The lungs show a small discoid airspace opacity in the left lower lobe compatible with mucous plugging, atelectasis and or aspiration.  Costophrenic angles are seen without effusion. No pneumothorax is identified. The heart is normal in size. The mediastinum is within normal limits. Osseous structures appear within normal limits. The visualized upper abdomen is unremarkable.    Lines and tubes: There is an endotracheal tube with tip projecting 4.8 cm from the wisam.  There is an enteric tube with tip below the field of view (subdiaphragmatic).  There is a right-sided IJ central venous catheter with tip at the level of the SVC.  Impression: Unchanged radiograph of the chest when accounting for differences in imaging technique.    Electronically signed by: Petr Ardon  Date:    07/28/2025  Time:    06:29  CT Abdomen Pelvis With IV Contrast NO Oral Contrast  Narrative: EXAM:  CT ABDOMEN PELVIS WITH IV CONTRAST    HISTORY:  GI bleed.    TECHNIQUE:  CT abdomen and pelvis was performed with intravenous administration.  Coronal and sagittal reformats were generated and reviewed.    All CT scans are performed using dose optimization techniques as appropriate to a performed exam including automated exposure control and/or standardized protocols  for targeted exams where dose is matched to indication/reason for exam/patient size.    COMPARISON:  None.    FINDINGS:  Liver:Normal.  Gallbladder and Biliary system:The bladder is nearly empty. Gallbladder wall is densely calcified.  Spleen:Normal.  Pancreas:Expansile heterogeneous appearance of the pancreatic head/body region is seen with mild surrounding inflammation.  Adrenals:Normal.  Kidneys and Ureters:Normal.  Bowel:No bowel obstruction. Rectal tube with inflated balloon seen. Appendix is normal. No evidence of arterial bleed.   Bladder:Neil catheter is in place.  Reproductive:Normal.  Peritoneum and Retroperitoneum: No free fluid or air.  Vessels: Portal vein and splenic vein are patent. The abdominal aorta is normal diameter.  Lymphatic:No mesenteric or retroperitoneal adenopathy.  Bone: Normal.  Soft tissues: Normal.  Impression: IMPRESSION:   1.  No evidence of arterial bleed.  2.  Expansile heterogeneous appearance of the pancreatic head/body region with mild surrounding inflammation suggests pancreatitis. Underlying mass cannot be excluded this time.  3.  Porcelain gallbladder.  4.  No bowel obstruction. Normal appendix.    -Electronically Signed By: Rina Strong DO   -Electronically Signed On:  7/28/2025 4:47 AM    Report Ends  CTA Chest Non-Coronary (PE Studies)  Narrative: EXAM: CTA CHEST    HISTORY: Male, age 49 years, Pulmonary embolism (PE) suspected, unknown D-dimer.    TECHNIQUE: CT angiography of the chest was performed after the administration of a bolus of intravenous contrast. Coronal and sagittal reformats and 3D reconstruction were done and reviewed.    All CT scans are performed using dose optimization techniques as appropriate to a performed exam including automated exposure control and/or standardized protocols for targeted exams where dose is matched to indication/reason for exam/patient size.    COMPARISON: None.    FINDINGS:   Vasculature: There is good opacification of the  pulmonary arteries without revealing a large central filling defect or abrupt cut off of peripheral enhancement.  Thoracic aorta and main pulmonary artery are normal diameter.    Lungs: Platelike atelectasis in the left lower lobe seen. Calcified granuloma in the left upper lobe seen. Tiny pulmonary nodule in the right middle lobe measures 4 mm. Additional prominent pulmonary nodule in the lateral aspect of the right lower lobe also seen. No consolidation. No cystic parenchymal changes.   Airways: Intraluminal filling defect within the proximal portion of the left lower lobe pulmonary bronchus is seen. The large airways are patent.  Pleura: No pleural effusion or pneumothorax.  Mediastinum: The heart is normal in size. There is no pericardial effusion. There is no mediastinal or hilar lymphadenopathy. The esophagus and visualized thyroid gland appear normal.  Bone: No fractures or suspicious osseus lesions.   Soft tissue: Normal.  Impression: IMPRESSION:  1.  No evidence of pulmonary embolism or acute aortic syndrome.  2.  Left lower lobe platelike atelectasis with intraluminal filling defect in the proximal left lower lobe pulmonary bronchus. This may be secondary to mucous plugging or aspiration. Consider bronchoscopy.  3.  Prior granulomatous disease.  4.  Right middle lobe and right lower lobe pulmonary nodules, 4 mm. No follow-up needed if patient is low-risk (and has no known or suspected primary neoplasm). Non-contrast chest CT can be considered in 12 months if patient is high-risk. (Fleischner society recommendation).    -Electronically Signed By: Rina Strong DO   -Electronically Signed On:  7/28/2025 4:40 AM    Report Ends  X-Ray Chest AP Portable  Narrative: EXAMINATION:  XR CHEST AP PORTABLE    CLINICAL HISTORY:  line placement;    TECHNIQUE:  Single frontal view of the chest was performed.    COMPARISON:  Radiograph of the chest from 07/27/2025.    FINDINGS:  Heart size and pulmonary vasculature are  within normal limits.  An endotracheal tube is present which terminates superior to the wisam.  A right IJ central venous catheter is present which terminates in the superior vena cava.  And esophageal gastric tube is present which terminates below the field of view.  No evidence of focal consolidation, pneumothorax, or pleural effusion.  No evidence of acute osseous process.  Impression: No evidence of acute cardiopulmonary process.    Electronically signed by: Jin Castro  Date:    07/28/2025  Time:    02:01  CT Cervical Spine Without Contrast  Narrative: EXAMINATION:  CT CERVICAL SPINE WITHOUT CONTRAST    CLINICAL HISTORY:  Found down;    TECHNIQUE:  Low dose axial images, sagittal and coronal reformations were performed though the cervical spine.  Contrast was not administered.    COMPARISON:  None available.    FINDINGS:  No evidence of acute fracture or subluxation involving the cervical spine.  Mild multilevel chronic degenerative changes are present involving the cervical spine.  The paraspinal soft tissues are unremarkable.  Impression: No evidence of acute fracture or subluxation involving the cervical spine.    Electronically signed by: Jin Castro  Date:    07/28/2025  Time:    00:23  CT Head Without Contrast  Narrative: EXAMINATION:  CT HEAD WITHOUT CONTRAST    CLINICAL HISTORY:  Mental status change, unknown cause;    TECHNIQUE:  Low dose axial CT images obtained throughout the head without intravenous contrast. Sagittal and coronal reconstructions were performed.    COMPARISON:  None available.    FINDINGS:  Intracranial compartment:    There is sulcal effacement and crowding and subarachnoid cisterns and questionable loss of gray-white differentiation involving the cerebral cortex.  No extra-axial blood or fluid collections.    No parenchymal mass, hemorrhage, edema or major vascular distribution infarct.    Skull/extracranial contents (limited evaluation):    No fracture. Mastoid air  cells and paranasal sinuses are essentially clear.  The orbits are unremarkable.  Impression: Findings concerning for diffuse hypoxic ischemic brain injury.    This critical information above was relayed by myself  by telephone to Dr. Yancey on 07/28/2025 at 00:10.    CRITICAL FINDINGS:  Intracranial ischemia    RECOMMENDATIONS:  Consult/Clinical Evaluation    Electronically signed by: Jin Castro  Date:    07/28/2025  Time:    00:20      ECHOCARDIOGRAM RESULTS (last 5)  No results found for this or any previous visit.      CURRENT/PREVIOUS VISIT EKG  Results for orders placed or performed during the hospital encounter of 07/27/25   EKG 12-lead    Collection Time: 07/28/25  4:28 AM   Result Value Ref Range    QRS Duration 84 ms    [facility] QTC Calculation 434 ms    Narrative    Test Reason : I46.9,    Vent. Rate : 103 BPM     Atrial Rate : 103 BPM     P-R Int : 120 ms          QRS Dur :  84 ms      QT Int : 332 ms       P-R-T Axes :  63 109  70 degrees    QTcB Int : 434 ms    Sinus tachycardia  Rightward axis  Borderline Abnormal ECG  No previous ECGs available    Referred By: AAAREFERRAL SELF           Confirmed By:            ASSESSMENT/PLAN:     Active Hospital Problems    Diagnosis    *Cardiac arrest    Metabolic acidosis, increased anion gap    Transaminitis    Ventricular fibrillation    Alcohol use    Elevated troponin    Shock    Anoxic brain injury    Aspiration pneumonia    Acute respiratory failure with hypoxia and hypercarbia       ASSESSMENT & PLAN:     Cardiac Arrest  VFIB arrest  Shock  Blood in rectal tube and OG tube-H/H Stable  Aspiration  Anoxic Brain Injury  Acute Renal, liver failure  8. Elevated Troponin  9. Acute pancreatitis  10. Elevated Lactic acid  11. ETOH Use  12. Tobacco use  13. HTN   14. Trend troponin, and lactic acid    RECOMMENDATIONS:    Continue support for now- on 2 pressor agents  Poor prognosis unfortunately  Next 24-48 hours family will need to make some hard decisions    Will follow and be available        GILMER Hopkins-ACNP-BC, CVNP-BC  Department of Cardiology  Date of Service: 07/28/2025          Patient seen and evaluated discussed with his ex-wife and relatives her present in the room.  His mother has poor of  legally and was not present  He has history of heavy alcohol use and smoking.  Family member was with him but went outside to smoke a cigarette when he came back in he was throwing up and became unresponsive.  He had CPR but the squad took a least 10 minutes to arrive.  He was shocked for shockable event.  He now has a anoxic encephalopathy on multiple pressors, amiodarone, Sandostatin secondary to blood per NG tube, ventilated.  EKG shows no acute changes.  Troponins went up to greater than 5000.  Most likely his cardiac arrest came after aspiration but can not rule out a primary cardiac event    Echos poor quality but probably normal ejection fraction to mildly decreased    Overall prognosis is poor.  Will follow with you continue supportive care                    [1]    [2]   No current facility-administered medications on file prior to encounter.     No current outpatient medications on file prior to encounter.

## 2025-07-28 NOTE — CONSULTS
Spoke to patient nurse Angela, patient very ill, states unable to assess at this time, states a lot going on.

## 2025-07-28 NOTE — ED NOTES
Per Dr Kumar only Anesthesia consult needs to be called now. Spoke with Ivea in ICU to let her know and they will call.

## 2025-07-28 NOTE — CONSULTS
GASTROENTEROLOGY INPATIENT CONSULT NOTE  Patient Name: Huber Burciaga  Patient MRN: [id number]  Patient : 1975    Admit Date: 2025  Service date: 2025    Reason for Consult: blood in og tube    PCP: No primary care provider on file.    Chief Complaint   Patient presents with    Cardiac Arrest     Family reports Pt with heavy drinking over the last 3-4 days was found down in his kitchen. Ems states no pulse on arrival at , morris applied at , Epi x 2 pt in vfib, pt shocked, epi x1. ROSC at        HPI: Patient is a 49 y.o. male admitted to the hospital after being resuscitated for cardiac arrest by family member.  Upon arrival patient was pulseless was given epi had a shockable rhythm and shocked once.  Golden achieved after 20 minutes.  Patient had a subsequent arrest in the ER with ROSC shortly after.  Pt  with alcohol intoxication pancreatitis now with multiorgan failure on pressor support with rising lactic acid level.  NG noted mild blood output as well as blood in the rectal tube.  Hemoglobin has been stable.  No history of past bleeding events.  CT head reports concern for anoxic brain injury    Past Medical History:  No past medical history on file.     Past Surgical History:  No past surgical history on file.     Home Medications:  Prescriptions Prior to Admission[1]    Inpatient Medications:   calcium gluconate IVPB  1 g Intravenous Once    mupirocin   Nasal BID    pantoprazole  40 mg Intravenous BID    piperacillin-tazobactam (Zosyn) IV (PEDS and ADULTS) (extended infusion is not appropriate)  4.5 g Intravenous Q8H       Current Facility-Administered Medications:     calcium gluconate IVPB, 1 g, Intravenous, PRN    calcium gluconate IVPB, 2 g, Intravenous, PRN    calcium gluconate IVPB, 3 g, Intravenous, PRN    calcium gluconate IVPB, 1 g, Intravenous, Once **AND** calcium gluconate IVPB, 1 g, Intravenous, Q10 Min PRN    dextrose 50%, 12.5 g, Intravenous, PRN    [COMPLETED]  "dextrose 50%, 50 g, Intravenous, Once **AND** dextrose 50%, 25 g, Intravenous, PRN **AND** [COMPLETED] insulin regular, 0.1 Units/kg, Intravenous, Once    glucagon (human recombinant), 1 mg, Intramuscular, PRN    insulin aspart U-100, 0-10 Units, Subcutaneous, Q6H PRN    magnesium sulfate 2 g IVPB, 2 g, Intravenous, PRN    magnesium sulfate 2 g IVPB, 2 g, Intravenous, PRN    potassium chloride, 40 mEq, Intravenous, PRN **AND** potassium chloride, 40 mEq, Intravenous, PRN **AND** potassium chloride, 40 mEq, Intravenous, PRN    sodium chloride 0.9%, 10 mL, Intravenous, PRN    sodium phosphate 15 mmol in D5W 250 mL IVPB, 15 mmol, Intravenous, PRN    sodium phosphate 20.1 mmol in D5W 250 mL IVPB, 20.1 mmol, Intravenous, PRN    sodium phosphate 30 mmol in D5W 250 mL IVPB, 30 mmol, Intravenous, PRN    Review of patient's allergies indicates:  Not on File    Social History:   Social History     Occupational History    Not on file   Tobacco Use    Smoking status: Not on file    Smokeless tobacco: Not on file   Substance and Sexual Activity    Alcohol use: Not on file    Drug use: Not on file    Sexual activity: Not on file       Family History:   No family history on file.    Review of Systems:  Unable to obtain    OBJECTIVE:    Physical Exam:  24 Hour Vital Sign Ranges: Temp:  [94.4 °F (34.7 °C)-106 °F (41.1 °C)] 97.1 °F (36.2 °C)  Pulse:  [] 107  Resp:  [14-41] 28  SpO2:  [81 %-100 %] 90 %  BP: ()/() 91/52  Arterial Line BP: ()/(47-71) 96/60  Most recent vitals: BP (!) 91/52   Pulse 107   Temp 97.1 °F (36.2 °C) (Rectal) Comment: on blanketrol, temperature management system  Resp (!) 28   Ht 5' 8" (1.727 m)   Wt 67.7 kg (149 lb 4 oz)   SpO2 (!) 90%   BMI 22.69 kg/m²    GEN:  Intubated   HEENT: PERRL, sclera anicteric, oral mucosa pink and moist without lesion  NECK: trachea midline; Good ROM  CV: regular rate and rhythm, no murmurs or gallops  RESP: clear to auscultation bilaterally, no " "wheezes, rhonci or rales  ABD:  Hypoactive bowel sounds soft abdomen   EXT: no swelling or edema, 2+ pulses distally  SKIN: no rashes or jaundice  PSYCH: normal affect    Labs:   Recent Labs     07/28/25  0116 07/28/25  0411 07/28/25  1050   WBC 20.86* 17.55* 10.76   MCV 92 93 92    222 158     Recent Labs     07/28/25  0411 07/28/25  0821 07/28/25  1050     141 143 143   K 3.7  3.7 3.1* 2.8*     101 101 100   CO2 15*  15* 21* 20*   BUN 22*  22* 27* 29*   *  250* 201* 196*     No results for input(s): "ALB" in the last 72 hours.    Invalid input(s): "ALKP", "SGOT", "SGPT", "TBIL", "DBIL", "TPRO"  Recent Labs     07/27/25  2143   INR 1.1         Radiology Review:  X-Ray Chest AP Portable   Final Result      Unchanged radiograph of the chest when accounting for differences in imaging technique.         Electronically signed by: Petr Ardon   Date:    07/28/2025   Time:    06:29      X-Ray Chest AP Portable   Final Result      No evidence of acute cardiopulmonary process.         Electronically signed by: Jin Castro   Date:    07/28/2025   Time:    02:01      CT Abdomen Pelvis With IV Contrast NO Oral Contrast   Final Result   IMPRESSION:    1.  No evidence of arterial bleed.   2.  Expansile heterogeneous appearance of the pancreatic head/body region with mild surrounding inflammation suggests pancreatitis. Underlying mass cannot be excluded this time.   3.  Porcelain gallbladder.   4.  No bowel obstruction. Normal appendix.      -Electronically Signed By: Rina Strong DO    -Electronically Signed On:  7/28/2025 4:47 AM         Report Ends      CTA Chest Non-Coronary (PE Studies)   Final Result   IMPRESSION:   1.  No evidence of pulmonary embolism or acute aortic syndrome.   2.  Left lower lobe platelike atelectasis with intraluminal filling defect in the proximal left lower lobe pulmonary bronchus. This may be secondary to mucous plugging or aspiration. Consider " bronchoscopy.   3.  Prior granulomatous disease.   4.  Right middle lobe and right lower lobe pulmonary nodules, 4 mm. No follow-up needed if patient is low-risk (and has no known or suspected primary neoplasm). Non-contrast chest CT can be considered in 12 months if patient is high-risk. (Fleischner society recommendation).      -Electronically Signed By: Rina Strong DO    -Electronically Signed On:  7/28/2025 4:40 AM         Report Ends      CT Cervical Spine Without Contrast   Final Result      No evidence of acute fracture or subluxation involving the cervical spine.         Electronically signed by: Jin Castro   Date:    07/28/2025   Time:    00:23      CT Head Without Contrast   Final Result      Findings concerning for diffuse hypoxic ischemic brain injury.      This critical information above was relayed by myself  by telephone to Dr. Yancey on 07/28/2025 at 00:10.      CRITICAL FINDINGS:  Intracranial ischemia      RECOMMENDATIONS:  Consult/Clinical Evaluation         Electronically signed by: Jin Castro   Date:    07/28/2025   Time:    00:20      X-Ray Chest AP Portable   Final Result      Atelectasis versus pneumonia at the right perihilar region.         Electronically signed by: Jin Castro   Date:    07/27/2025   Time:    22:31            IMPRESSION / RECOMMENDATIONS:  49-year-old male with multiorgan failure in setting of cardiac arrest, pneumonia, pancreatitis and ongoing alcohol abuse  -NG with only thin blood and no gross blood present.  Hemoglobin has been stable this admission.  No immediate plans for endoscopy.  Recommend PPI infusion or IV b.i.d..  Call if overt significant bleeding.  -very guarded prognosis with rising lactic acid level severe elevation of troponin with concern for rhabdo and pancreatitis along with renal failure and CT head showing findings concerning for diffuse hypoxic ischemic brain injury       Cuco Castillo  7/28/2025  5:30 PM             [1]    Medications Prior to Admission   Medication Sig Dispense Refill Last Dose/Taking    losartan (COZAAR) 25 MG tablet Take 25 mg by mouth once daily.   Taking    paroxetine (PAXIL) 40 MG tablet Take 40 mg by mouth once daily.   Taking

## 2025-07-28 NOTE — CONSULTS
INPATIENT NEPHROLOGY CONSULT   [facility]    Patient Name: Huber Burciaga  Date: 07/28/2025    Reason for consultation: NATE    Chief Complaint:   Chief Complaint   Patient presents with    Cardiac Arrest     Family reports Pt with heavy drinking over the last 3-4 days was found down in his kitchen. Ems states no pulse on arrival at 2031, morris applied at 2032, Epi x 2 pt in vfib, pt shocked, epi x1. ROSC at 2053       History of Present Illness:  Patient is a 49-year-old male with history of alcohol use who presents to the emergency room for cardiac arrest. Patient intubated and sedated on examination, history supplemented by chart. Patient reportedly has been drinking heavily for the last 3-4 days, apparently this happens whenever patient's son returns back to his ex-wife. Reportedly family member was outside for about 15 minutes and came back in and found the patient lying on the kitchen floor, unresponsive. Per patient's cousin at bedside states a family member started CPR and called 911. Upon arrival by EMS patient was pulseless, was given epi, had shockable rhythm and was shocked once. ROSC achieved after about 20 minutes. Patient again then had another cardiac arrest in the emergency room and ROCS was achieved shortly after. Consulted for NATE.    Interval History:  7/28- CT imaging with anoxic brain injury and pancreatitis; on levophed, vasopressin, amiodarone, bicarb gtt, octreotide gtt, LR and sedation    Plan of Care:    Assessment:  Cardiac arrest with arrhythmia in setting of ETOH intoxication  Anoxic brain injury due to above  ETOH pancreatitis  NATE due to above- got IV contrast at admission as well  Hyperkalemia/Hypokalemia  Metabolic and Lactic acidosis  Shock liver    Plan:    - he is critically ill with high risk for mortality- updated family at bedside including ex wife  - plan for 48-72 hours of medical management for neuro prognostication  - dose meds for CrCl < 20  -  anticipate trajectory of ATN- not a candidate for RRT due to hemodynamic instability and medical futility- continue pereira  - on maximal supportive care with serial labs- monitor K, Mg, Ca, phos closely- check CK for rhabdo  - will adjust bicarb gtt and LR based on labs  - trend LFTS    Thank you for allowing us to participate in this patient's care. We will continue to follow.    Vital Signs:  Temp Readings from Last 3 Encounters:   07/28/25 97.3 °F (36.3 °C) (Rectal)       Pulse Readings from Last 3 Encounters:   07/28/25 63       BP Readings from Last 3 Encounters:   07/28/25 (!) 91/52       Weight:  Wt Readings from Last 3 Encounters:   07/27/25 67.7 kg (149 lb 4 oz)       INS/OUTS:  I/O last 3 completed shifts:  In: 4000.8 [I.V.:2421.6; IV Piggyback:1579.2]  Out: 1510 [Urine:210; Stool:1300]  I/O this shift:  In: 3267.8 [I.V.:1168.5; IV Piggyback:2099.3]  Out: 730 [Urine:30; Stool:700]    Past Medical & Surgical History:  No past medical history on file.    No past surgical history on file.    Past Social History:  Social History     Socioeconomic History    Marital status: Single       Medications:  Scheduled Meds:   calcium gluconate IVPB  1 g Intravenous Once    lactated ringers  1,000 mL Intravenous Once    mupirocin   Nasal BID    pantoprazole  40 mg Intravenous BID    piperacillin-tazobactam (Zosyn) IV (PEDS and ADULTS) (extended infusion is not appropriate)  4.5 g Intravenous Q8H     Continuous Infusions:   amiodarone in dextrose 5%  0.5 mg/min Intravenous Continuous   Held at 07/28/25 0645    fentanyl  0-250 mcg/hr Intravenous Continuous 17.5 mL/hr at 07/28/25 1207 175 mcg/hr at 07/28/25 1207    lactated ringers   Intravenous Continuous   Paused at 07/28/25 1203    NORepinephrine bitartrate-D5W  0-3 mcg/kg/min Intravenous Continuous 7.9 mL/hr at 07/28/25 1207 0.25 mcg/kg/min at 07/28/25 1207    octreotide (SANDOSTATIN) 500 mcg in 0.9% NaCl 100 mL infusion  50 mcg/hr Intravenous Continuous        propofoL  " 0-50 mcg/kg/min Intravenous Continuous 20.3 mL/hr at 07/28/25 1207 50 mcg/kg/min at 07/28/25 1207    sodium bicarbonate 150 mEq in D5W 1,000 mL infusion   Intravenous Continuous 150 mL/hr at 07/28/25 1207 Rate Verify at 07/28/25 1207    vasopressin  0.04 Units/min Intravenous Continuous 12 mL/hr at 07/28/25 1207 0.04 Units/min at 07/28/25 1207     PRN Meds:.  Current Facility-Administered Medications:     calcium gluconate IVPB, 1 g, Intravenous, PRN    calcium gluconate IVPB, 2 g, Intravenous, PRN    calcium gluconate IVPB, 3 g, Intravenous, PRN    calcium gluconate IVPB, 1 g, Intravenous, Once **AND** calcium gluconate IVPB, 1 g, Intravenous, Q10 Min PRN    dextrose 50%, 12.5 g, Intravenous, PRN    [COMPLETED] dextrose 50%, 50 g, Intravenous, Once **AND** dextrose 50%, 25 g, Intravenous, PRN **AND** [COMPLETED] insulin regular, 0.1 Units/kg, Intravenous, Once    glucagon (human recombinant), 1 mg, Intramuscular, PRN    insulin aspart U-100, 0-10 Units, Subcutaneous, Q6H PRN    magnesium sulfate 2 g IVPB, 2 g, Intravenous, PRN    magnesium sulfate 2 g IVPB, 2 g, Intravenous, PRN    potassium chloride, 40 mEq, Intravenous, PRN **AND** potassium chloride, 40 mEq, Intravenous, PRN **AND** potassium chloride, 40 mEq, Intravenous, PRN    sodium chloride 0.9%, 10 mL, Intravenous, PRN    sodium phosphate 15 mmol in D5W 250 mL IVPB, 15 mmol, Intravenous, PRN    sodium phosphate 20.1 mmol in D5W 250 mL IVPB, 20.1 mmol, Intravenous, PRN    sodium phosphate 30 mmol in D5W 250 mL IVPB, 30 mmol, Intravenous, PRN  Medications Ordered Prior to Encounter[1]    Allergies:  Patient has no allergy information on record.    Past Family History:  Reviewed; refer to Hospitalist Admission Note    Review of Systems:  On MV    Physical Exam:  BP (!) 91/52   Pulse 63   Temp 97.3 °F (36.3 °C) (Rectal)   Resp (!) 21   Ht 5' 8" (1.727 m)   Wt 67.7 kg (149 lb 4 oz)   SpO2 96%   BMI 22.69 kg/m²     General Appearance:    Ill   Head:    " Normocephalic, atraumatic   Eyes:    Closed    Mouth:   Moist mucus membranes, no thrush or oral lesions, normal      dentition   Back:     No CVA tenderness   Lungs:     Clear to auscultation bilaterally, no wheeze, crackles, rales      or rhonchi, symmetric air movement, respirations unlabored   Heart:    Regular rate and rhythm, S1 and S2 normal, no murmur, rub   or gallop   Abdomen:     Soft, non-tender, non-distended, bowel sounds active all four   quadrants, no RT or guarding, no masses, no organomegaly   Extremities:   Warm and well perfused, distal pulses intact, no cyanosis or    peripheral edema   MSK:   No joint or muscle swelling, tenderness or deformity   Skin:   Skin color, texture, turgor normal, no rashes or lesions   Neurologic/Psychiatric:   Sedated     Results:  Lab Results   Component Value Date     07/28/2025    K 3.1 (L) 07/28/2025     07/28/2025    CO2 21 (L) 07/28/2025    BUN 27 (H) 07/28/2025    CREATININE 3.2 (H) 07/28/2025    CALCIUM 7.6 (L) 07/28/2025    ANIONGAP 21 (H) 07/28/2025       Lab Results   Component Value Date    CALCIUM 7.6 (L) 07/28/2025       Recent Labs   Lab 07/28/25  0411   WBC 17.55*   RBC 5.50   HGB 17.4   HCT 51.1      MCV 93   MCH 31.6*   MCHC 34.1       I have personally reviewed pertinent radiological imaging and reports from this admission.    I have spent > 70 minutes providing care for this patient for the above diagnoses. These services have included chart/data/imaging review, evaluation, exam, formulation of plan, , note preparation, and discussions with staff involved in this patient's care.    Xenia pU MD MPH   [facility]  [address] [place] [id number]  [phone] (p)  [phone] (f)         [1]   No current facility-administered medications on file prior to encounter.     Current Outpatient Medications on File Prior to Encounter   Medication Sig Dispense Refill    losartan (COZAAR) 25 MG  tablet Take 25 mg by mouth once daily.      paroxetine (PAXIL) 40 MG tablet Take 40 mg by mouth once daily.

## 2025-07-28 NOTE — SUBJECTIVE & OBJECTIVE
No past medical history on file.    No past surgical history on file.    Review of patient's allergies indicates:  Not on File    No current facility-administered medications on file prior to encounter.     No current outpatient medications on file prior to encounter.     Family History    None       Tobacco Use    Smoking status: Not on file    Smokeless tobacco: Not on file   Substance and Sexual Activity    Alcohol use: Not on file    Drug use: Not on file    Sexual activity: Not on file     Review of Systems   Unable to perform ROS: Acuity of condition     Objective:     Vital Signs (Most Recent):  Temp: (!) 94.4 °F (34.7 °C) (07/27/25 2259)  Pulse: 107 (07/27/25 2353)  Resp: (!) 26 (07/27/25 2353)  BP: (!) 89/55 (07/27/25 2143)  SpO2: (!) 93 % (07/27/25 2353) Vital Signs (24h Range):  Temp:  [94.4 °F (34.7 °C)] 94.4 °F (34.7 °C)  Pulse:  [] 107  Resp:  [26-34] 26  SpO2:  [93 %-100 %] 93 %  BP: (80-89)/(49-55) 89/55     Weight: 67.7 kg (149 lb 4 oz)  Body mass index is 22.69 kg/m².     Physical Exam  Vitals reviewed.   Constitutional:       Appearance: He is ill-appearing.   HENT:      Head: Atraumatic.   Eyes:      Comments: Pupils dilated and nonreactive to light   Cardiovascular:      Rate and Rhythm: Regular rhythm. Tachycardia present.   Pulmonary:      Breath sounds: No wheezing.      Comments: Mechanical breath sounds  Abdominal:      General: There is distension.   Musculoskeletal:         General: No swelling.   Skin:     General: Skin is dry.   Neurological:      Comments: Sedated                   Significant Labs: All pertinent labs within the past 24 hours have been reviewed.  CBC:   Recent Labs   Lab 07/27/25 2127 07/27/25 2143   WBC  --  10.06   HGB  --  14.4   HCT 42 45.3   PLT  --  240     CMP:   Recent Labs   Lab 07/27/25 2143      K 4.1      CO2 22*   *   BUN 14   CREATININE 1.4   CALCIUM 8.1*   PROT 6.0   ALBUMIN 3.7   BILITOT 0.3   ALKPHOS 91   *   *    ANIONGAP 19*       Significant Imaging: I have reviewed all pertinent imaging results/findings within the past 24 hours.

## 2025-07-28 NOTE — CONSULTS
[facility]  Palliative Medicine  Consult Note    Patient Name: Huber Burciaga  MRN: [id number]  Admission Date: 7/27/2025  Hospital Length of Stay: 1 days  Code Status: Full Code   Attending Provider: Phyllis Taylor MD  Consulting Provider: Anuj Rhodes MD  Primary Care Physician: No primary care provider on file.  Principal Problem:Cardiac arrest    Patient information was obtained from patient, past medical records, and primary team.      Inpatient consult to Palliative Care  Consult performed by: Anuj Rhodes MD  Consult ordered by: Isabel Kumar MD        Assessment/Plan:     Palliative Care  Goals of care, counseling/discussion  I reviewed the patient's chart and discussed the case with the patient's team.      I examined Huber Burciaga at bedside.  The patient lacks capacity for complex medical decision-making.  No family present.     He is currently admitted to the ICU.  He is critically ill due to multiorgan failure.    At this point his prognosis is guarded.  Given his multiorgan failure he is at a very high risk of dying during this hospitalization.  In the very best case scenario he will have many weeks and more likely months of recovery ahead.    We will continue to reach out to family to explore his wishes and values.    Per chart review he is not  and he does not have any grown children.  His mother is listed in the chart.  If this information is accurate she should act as his surrogate decisionmaker per my understanding of [place] statutes. In [place] the hierarchy is as follows (if that individual does not desire to act as patient's surrogate, then proceed down the hierarchy. Any individual can defer their role as surrogate, but they can not designate another surrogate; only the patient can designate a specific surrogate):  - Any adult for himself  - Judicially appointed  or  if one has been appointed  - An agent acting pursuant to a valid  "mandate (HPOA)  - Patient's spouse not judicially   - Adult children  - Any parent  - Patient's sibling  - Patient's other ascendants or descendants  - Adult friend    We will continue to follow. I hope we can be helpful.           Thank you for your consult. I will follow-up with patient. Please contact us if you have any additional questions.    Subjective:     HPI:   Per admit H&P: "Patient is a 49-year-old male with history of alcohol use presents to the emergency room for cardiac arrest. Patient intubated and sedated on examination, history supplemented by ED provider. Patient reportedly has been drinking heavily for the last 3-4 days, apparently this happens whenever patient's son returns back to his ex-wife. Reportedly family member was outside for about 15 minutes and came back in and found the patient lying on the kitchen floor, unresponsive. Per patient's cousin at bedside states a family member started CPR and called 911. Upon arrival by EMS patient was pulseless, was given epi, had shockable rhythm and was shocked once. ROSC achieved after about 20 minutes. Patient again then had another cardiac arrest in the emergency room and ROCS was achieved shortly after. Labs showed anion gap 19, glucose 218, Mag 2.9, elevated liver enzymes, elevated BNP and troponin, no ST elevations on EKG. Initial ABG showed respiratory and metabolic acidosis. Patient had blood-tinged output from OG tube. Chest x-ray showing findings of possible pneumonia. CT head, cervical spine, abdomen and pelvis and chest pending."    He is currently admitted to the ICU after cardiac arrest.  Initial workup has revealed what appears to be a hypoxic brain injury.  Unfortunately course has been complicated by multiorgan failure; shock on multiple vasopressor support, respiratory failure on invasive mechanical ventilation, renal failure, and acute ischemic hepatitis.  He is critically ill with a guarded and potentially very poor " prognosis.  I have been asked to assist with goals of care.    Hospital Course:  No notes on file    Interval History:  See HPI    No past medical history on file.    No past surgical history on file.    Review of patient's allergies indicates:  Not on File    Medications:  Continuous Infusions:   amiodarone in dextrose 5%  0.5 mg/min Intravenous Continuous   Held at 07/28/25 0645    fentanyl  0-250 mcg/hr Intravenous Continuous 15 mL/hr at 07/28/25 1640 150 mcg/hr at 07/28/25 1640    lactated ringers   Intravenous Continuous 250 mL/hr at 07/28/25 1640 Rate Verify at 07/28/25 1640    NORepinephrine bitartrate-D5W  0-3 mcg/kg/min Intravenous Continuous 15.2 mL/hr at 07/28/25 1640 0.48 mcg/kg/min at 07/28/25 1640    octreotide (SANDOSTATIN) 500 mcg in 0.9% NaCl 100 mL infusion  50 mcg/hr Intravenous Continuous 10 mL/hr at 07/28/25 1640 50 mcg/hr at 07/28/25 1640    propofoL  0-50 mcg/kg/min Intravenous Continuous 20.3 mL/hr at 07/28/25 1640 50 mcg/kg/min at 07/28/25 1640    sodium bicarbonate 150 mEq in D5W 1,000 mL infusion   Intravenous Continuous 150 mL/hr at 07/28/25 1640 Rate Verify at 07/28/25 1640    vasopressin  0.04 Units/min Intravenous Continuous 12 mL/hr at 07/28/25 1640 0.04 Units/min at 07/28/25 1640     Scheduled Meds:   calcium gluconate IVPB  1 g Intravenous Once    mupirocin   Nasal BID    pantoprazole  40 mg Intravenous BID    piperacillin-tazobactam (Zosyn) IV (PEDS and ADULTS) (extended infusion is not appropriate)  4.5 g Intravenous Q8H     PRN Meds:  Current Facility-Administered Medications:     calcium gluconate IVPB, 1 g, Intravenous, PRN    calcium gluconate IVPB, 2 g, Intravenous, PRN    calcium gluconate IVPB, 3 g, Intravenous, PRN    calcium gluconate IVPB, 1 g, Intravenous, Once **AND** calcium gluconate IVPB, 1 g, Intravenous, Q10 Min PRN    dextrose 50%, 12.5 g, Intravenous, PRN    [COMPLETED] dextrose 50%, 50 g, Intravenous, Once **AND** dextrose 50%, 25 g, Intravenous, PRN **AND**  [COMPLETED] insulin regular, 0.1 Units/kg, Intravenous, Once    glucagon (human recombinant), 1 mg, Intramuscular, PRN    insulin aspart U-100, 0-10 Units, Subcutaneous, Q6H PRN    magnesium sulfate 2 g IVPB, 2 g, Intravenous, PRN    magnesium sulfate 2 g IVPB, 2 g, Intravenous, PRN    potassium chloride, 40 mEq, Intravenous, PRN **AND** potassium chloride, 40 mEq, Intravenous, PRN **AND** potassium chloride, 40 mEq, Intravenous, PRN    sodium chloride 0.9%, 10 mL, Intravenous, PRN    sodium phosphate 15 mmol in D5W 250 mL IVPB, 15 mmol, Intravenous, PRN    sodium phosphate 20.1 mmol in D5W 250 mL IVPB, 20.1 mmol, Intravenous, PRN    sodium phosphate 30 mmol in D5W 250 mL IVPB, 30 mmol, Intravenous, PRN    Family History    None       Tobacco Use    Smoking status: Not on file    Smokeless tobacco: Not on file   Substance and Sexual Activity    Alcohol use: Not on file    Drug use: Not on file    Sexual activity: Not on file       Review of Systems   Unable to perform ROS: Intubated     Objective:     Vital Signs (Most Recent):  Temp: 97.1 °F (36.2 °C) (on blanketrol, temperature management system) (07/28/25 1501)  Pulse: (!) 111 (07/28/25 1630)  Resp: (!) 28 (07/28/25 1630)  BP: (!) 91/52 (07/28/25 1000)  SpO2: (!) 94 % (07/28/25 1515) Vital Signs (24h Range):  Temp:  [94.4 °F (34.7 °C)-106 °F (41.1 °C)] 97.1 °F (36.2 °C)  Pulse:  [] 111  Resp:  [14-41] 28  SpO2:  [81 %-100 %] 94 %  BP: ()/() 91/52  Arterial Line BP: ()/(47-71) 76/65     Weight: 67.7 kg (149 lb 4 oz)  Body mass index is 22.69 kg/m².       Physical Exam  Vitals reviewed.   Constitutional:       General: He is not in acute distress.     Appearance: He is ill-appearing and toxic-appearing.   HENT:      Head: Normocephalic and atraumatic.      Right Ear: External ear normal.      Left Ear: External ear normal.      Nose: Nose normal. No congestion.      Mouth/Throat:      Pharynx: No oropharyngeal exudate.   Eyes:      General:          Right eye: No discharge.         Left eye: No discharge.   Cardiovascular:      Rate and Rhythm: Tachycardia present.   Pulmonary:      Comments: Orally intubated and mechanically ventilated  Abdominal:      Palpations: Abdomen is soft.   Neurological:      Comments: Sedated.  Per nursing his pupils are fixed and he is without cough reflex   Psychiatric:      Comments: Sedated and unable to assess            Review of Symptoms      Symptom Assessment (ESAS 0-10 Scale)  Unable to complete assessment due to Patient nonverbal         Pain Assessment in Advanced Demential Scale (PAINAD)   Breathing - Independent of vocalization:  0  Negative vocalization:  0  Facial expression:  0  Body language:  0  Consolability:  0  Total:  0    Performance Status:  10    Psychosocial/Cultural:   See Palliative Psychosocial Note: No  **Primary  to Follow**  Palliative Care  Consult: No        Advance Care Planning  Advance Directives:     Decision Making:  Patient unable to communicate due to disease severity/cognitive impairment  Goals of Care: No family present.  Unable to determine at this time.         Significant Labs: BMP:   Recent Labs   Lab 07/27/25 2143 07/28/25 0116 07/28/25  1050   *   < > 196*      < > 143   K 4.1   < > 2.8*      < > 100   CO2 22*   < > 20*   BUN 14   < > 29*   CREATININE 1.4   < > 3.7*   CALCIUM 8.1*   < > 7.5*   MG 2.9*  --   --     < > = values in this interval not displayed.     CBC:   Recent Labs   Lab 07/28/25 0116 07/28/25  0211 07/28/25  0410 07/28/25  0411 07/28/25  1050   WBC 20.86*  --   --  17.55* 10.76   HGB 18.4*  --   --  17.4 17.3   HCT 55.0*   < > 53 51.1 49.4     --   --  222 158    < > = values in this interval not displayed.     CBC:   Recent Labs   Lab 07/28/25  1050   WBC 10.76   HGB 17.3   HCT 49.4   MCV 92        BMP:  Recent Labs   Lab 07/27/25 2143 07/28/25  0116 07/28/25  1050   *   < > 196*       < > 143   K 4.1   < > 2.8*      < > 100   CO2 22*   < > 20*   BUN 14   < > 29*   CREATININE 1.4   < > 3.7*   CALCIUM 8.1*   < > 7.5*   MG 2.9*  --   --     < > = values in this interval not displayed.     LFT:  Lab Results   Component Value Date    AST  07/28/2025      Comment:      2+ Lipemia; Error in reaction rate for rate assay. Unable to calculate result.      (H) 07/28/2025    ALKPHOS 196 (H) 07/28/2025    BILITOT 1.5 (H) 07/28/2025     Albumin:   Albumin   Date Value Ref Range Status   07/28/2025 3.5 3.5 - 5.2 g/dL Final     Protein:   Protein Total   Date Value Ref Range Status   07/28/2025 5.8 (L) 6.0 - 8.4 gm/dL Final     Lactic acid:   Lab Results   Component Value Date    LACTATE 15.7 (HH) 07/28/2025    LACTATE 13.2 (HH) 07/28/2025       Significant Imaging: I have reviewed all pertinent imaging results/findings within the past 24 hours.      I spent a total of 75 minutes on the day of the visit. This includes face to face time in discussion of goals of care, symptom assessment, coordination of care and emotional support.  This also includes non-face to face time preparing to see the patient (eg, review of tests/imaging), obtaining and/or reviewing separately obtained history, documenting clinical information in the electronic or other health record, independently interpreting results and communicating results to the patient/family/caregiver, or care coordinator.    Anuj Rhodes MD  Palliative Medicine  [facility]

## 2025-07-28 NOTE — TELEMEDICINE CONSULT
"[facility]   General Neurology  Consult Note      Consult Information  Inpatient Consult to Neurology Services (General Neurology)  Consult performed by: Fanny Wen FNP  Consult ordered by: Phyllis Taylor MD  Reason for consult: anoxic brain injury, s/p code cardiac arrest          Consulting Provider:    Current Providers  No providers found    Patient Location:  [facility] ICU A 2ND FLOOR IP Unit    Spoke hospital nurse at bedside with patient assisting consultant.  Patient information was obtained from ER records and primary team.       Neurology Documentation       Blood pressure (!) 91/52, pulse 80, temperature 97.1 °F (36.2 °C), temperature source Rectal, resp. rate 14, height 5' 8" (1.727 m), weight 67.7 kg (149 lb 4 oz), SpO2 95%.    Medical Decision Making  HPI:  49-year-old male with a history of alcohol use disorder presented to the emergency department following an out-of-hospital cardiac arrest. The patient is currently intubated and sedated; history is supplemented by emergency medical services and family at bedside. Per collateral history, the patient had reportedly been consuming large quantities of alcohol over the preceding 3-4 days, a pattern that recurs in the context of psychosocial stressors. A family member found the patient unresponsive on the kitchen floor after an estimated 15-minute interval during which the patient was alone. Immediate bystander CPR was initiated, and EMS were activated. On EMS arrival, the patient was pulseless; advanced cardiac life support was initiated, including administration of epinephrine and one defibrillation for a shockable rhythm. Return of spontaneous circulation (ROSC) was achieved after approximately 20 minutes of resuscitation. The patients history of heavy alcohol use raises concern for metabolic derangements, including hypoglycemia, electrolyte disturbances, and possible alcohol-related ketoacidosis.     Images personally reviewed and " "interpreted:  Study: Head CT  Study Interpretation:   "Impression:     Findings concerning for diffuse hypoxic ischemic brain injury."    Additional studies reviewed:   Study: Cardiac_Neuro: EKG  Study Interpretation: sinus tachycardia    Laboratory studies reviewed:  BMP:   Lab Results   Component Value Date     07/28/2025    K 2.8 (LL) 07/28/2025     07/28/2025    CO2 20 (L) 07/28/2025    BUN 29 (H) 07/28/2025    CREATININE 3.7 (H) 07/28/2025    CALCIUM 7.5 (L) 07/28/2025     CBC:   Lab Results   Component Value Date    WBC 10.76 07/28/2025    RBC 5.40 07/28/2025    HGB 17.3 07/28/2025    HCT 49.4 07/28/2025    HCT 53 07/28/2025     07/28/2025    MCV 92 07/28/2025    MCH 32.0 (H) 07/28/2025    MCHC 35.0 07/28/2025       Documentation personally reviewed:  Notes: Notes: ED notes and H&P     Assessment and plan:  49-year-old male with a history of alcohol use disorder presented after out-of-hospital cardiac arrest, with ROSC achieved after approximately 20 minutes of resuscitation and a subsequent recurrent arrest in the ED. The patient is currently intubated and sedated. History is supplemented by ED staff and family. The arrest was witnessed, with bystander CPR initiated promptly. The patient had reportedly been drinking heavily for several days prior to the event. Initial labs revealed an anion gap metabolic acidosis, hyperglycemia, elevated liver enzymes, elevated BNP and troponin, and blood-tinged output from OG tube. Chest X-ray suggests possible pneumonia. Initial ABG demonstrated mixed respiratory and metabolic acidosis.     --May complete MRI brain tomorrow, if not feasible CT head okay.  --Wean sedation as able for accurate neurological examinations.  --Target therapeutic hypothermia/normothermia protocol as per primary team   --Control any fevers, avoid hypotension, control acidosis, correct electrolytes as needed, avoid hypoxia or hypercapnia, avoid hypoglycemia  --Maintain cerebral " perfusion, avoid any hypotension.   --Seizure precautions.    --Discussed CT head imaging with family, verbalized understanding.    Post charge discharge plan:  Clinic follow up: Other: TBD    Visit Type: N/A  Timeframe: N/A        Additional Physical Exam, History, & ROS  Review of Systems   Reason unable to perform ROS: BETHANY, intubated/sedated.     Physical Exam  Constitutional:       Appearance: He is ill-appearing.   HENT:      Head: Normocephalic and atraumatic.      Nose: Nose normal.      Mouth/Throat:      Mouth: Mucous membranes are moist.      Pharynx: Oropharynx is clear.   Eyes:      Comments: Pupils 6-7mm, fixed bilaterally   Cardiovascular:      Rate and Rhythm: Normal rate.   Pulmonary:      Comments: Ventilator assisted, ETT in place  Abdominal:      Comments: OG tube in place, bloody drainage noted   Musculoskeletal:      Comments: No purposeful movement   Skin:     General: Skin is warm.      Capillary Refill: Capillary refill takes less than 2 seconds.   Neurological:      Mental Status: He is disoriented.      GCS: GCS eye subscore is 1. GCS verbal subscore is 1. GCS motor subscore is 1.      Cranial Nerves: Cranial nerve deficit present.      Sensory: Sensory deficit present.      Motor: Weakness present.      Comments: On Propofol 50mcg/kg/min and Fentanyl 175mcg/hr infusions  -GCS [id number]  -Mental Status: Does not open eyes to pain. Follows no commands.  -Cranial Nerves: Does not blink to threat. Pupils fixed, round, and non-reactive. Bilateral corneals not intact. Cough not intact.  -Motor/Sensation: No response to painful stimuli .    Unable to test orientation, language, memory, judgment, insight, fund of knowledge, hearing, shoulder shrug, tongue protrusion, coordination, gait due to level of consciousness.        No past medical history on file.  No past surgical history on file.  No family history on file.    Diagnoses  Problem Noted   Anoxic Brain Injury 7/28/2025   Cardiac Arrest  7/27/2025       STEFFI Barnhart    Discussed case with Dr. Guerra, chart reviewed. Any change to plan along with cosign to appear in the EMR.      Neurology consultation requested by spoke provider. Audiovisual encounter with the patient performed using a secure connection.  Results and impressions from the visit are documented on this note and were communicated to the consulting provider/team via direct communication. The note has been shared for addition to the patients electronic medical record.

## 2025-07-28 NOTE — ED PROVIDER NOTES
Encounter Date: 7/27/2025       History     Chief Complaint   Patient presents with    Cardiac Arrest     Family reports Pt with heavy drinking over the last 3-4 days was found down in his kitchen. Ems states no pulse on arrival at 2031, chapin applied at 2032, Epi x 2 pt in vfib, pt shocked, epi x1. ROSC at 2053     LILY Burciaga is a 49 year old male patient who presents to the ED s/p cardiac arrest.  Patient's family reports that patient has been drinking heavily over the last 4-5 days.  They were over the patient's house when he was in the kitchen, sitting on the ground.  The family briefly went outside for about 15-20 minutes and when they returned they found the patient unresponsive on the floor.  On EMS arrival, patient pulseless, apneic, unresponsive.  CPR initiated with Chapin device, patient given epi x2, found to be in VFib at pulse check, patient shocked x1, then given additional epi.  ROS achieved after 20 minutes of CPR.  Patient intubated prior to arrival by EMS.  Family reports that patient was not complaining of acute illness, injury prior to arrest.  It they did not witnessed any falls.    Review of patient's allergies indicates:  Not on File  No past medical history on file.  No past surgical history on file.  No family history on file.  Social History[1]  Review of Systems   Unable to perform ROS: Patient unresponsive       Physical Exam     Initial Vitals   BP Pulse Resp Temp SpO2   07/27/25 2131 07/27/25 2131 07/27/25 2131 07/27/25 2259 07/27/25 2143   (!) 80/49 104 (!) 27 (!) 94.4 °F (34.7 °C) 100 %      MAP       --                Physical Exam    Nursing note and vitals reviewed.  Constitutional: He is intubated. Cervical collar and backboard in place.   HENT:   Head: Normocephalic and atraumatic.   Eyes:   Pupils 4 mm and fixed.   Cardiovascular:    Bradycardia present.         Pulmonary/Chest: He is intubated.   ET tube in place.  Coarse breath sounds bilaterally with bagging.    Abdominal: Abdomen is soft. He exhibits distension.     Neurological: GCS eye subscore is 1. GCS verbal subscore is 1. GCS motor subscore is 1.   Skin: Skin is warm and dry.         ED Course   Critical Care    Date/Time: 7/28/2025 4:45 AM    Performed by: Pinky Yancey MD  Authorized by: Isabel Kumar MD  Direct patient critical care time: 30 minutes  Additional history critical care time: 10 minutes  Ordering / reviewing critical care time: 10 minutes  Documentation critical care time: 5 minutes  Consulting other physicians critical care time: 5 minutes  Consult with family critical care time: 5 minutes  Total critical care time (exclusive of procedural time) : 65 minutes  Critical care was necessary to treat or prevent imminent or life-threatening deterioration of the following conditions: cardiac failure, circulatory failure, CNS failure or compromise and shock.  Critical care was time spent personally by me on the following activities: development of treatment plan with patient or surrogate, discussions with consultants, gastric intubation, interpretation of cardiac output measurements, evaluation of patient's response to treatment, examination of patient, obtaining history from patient or surrogate, ordering and performing treatments and interventions, ordering and review of laboratory studies, ordering and review of radiographic studies, pulse oximetry and re-evaluation of patient's condition.        Labs Reviewed   COMPREHENSIVE METABOLIC PANEL - Abnormal       Result Value    Sodium 142      Potassium 4.1      Chloride 101      CO2 22 (*)     Glucose 218 (*)     BUN 14      Creatinine 1.4      Calcium 8.1 (*)     Protein Total 6.0      Albumin 3.7      Bilirubin Total 0.3      ALP 91       (*)      (*)     Anion Gap 19 (*)     eGFR >60     MAGNESIUM - Abnormal    Magnesium 2.9 (*)    TROPONIN I HIGH SENSITIVITY - Abnormal    Troponin High Sensitive 24.0 (*)    CBC WITH DIFFERENTIAL  - Abnormal    WBC 10.06      RBC 4.67      Hgb 14.4      Hct 45.3      MCV 97      MCH 30.8      MCHC 31.8 (*)     RDW 12.5      Platelet Count 240      MPV 10.1      Nucleated RBC 1 (*)     Neut % 26.9 (*)     Lymph % 63.9 (*)     Mono % 4.3      Eos % 0.4      Basophil % 0.7      Imm Grans % 3.8 (*)     Neut # 2.7      Lymph # 6.43 (*)     Mono # 0.43      Eos # 0.04      Baso # 0.07      Imm Grans # 0.38 (*)    B-TYPE NATRIURETIC PEPTIDE (PERFORMABLE ONLY) - Abnormal     (*)    TROPONIN I HIGH SENSITIVITY - Abnormal    Troponin High Sensitive 133.2 (*)    ALCOHOL,MEDICAL (ETHANOL) - Abnormal    Alcohol, Serum 243 (*)    ISTAT PROCEDURE - Abnormal    POC PH 6.906 (*)     POC PCO2 100.8 (*)     POC PO2 97 (*)     POC HCO3 20.0 (*)     POC BE -13 (*)     POC SATURATED O2 89      POC Glucose 273 (*)     POC Sodium 137      POC Potassium 5.3 (*)     POC TCO2 23 (*)     POC Ionized Calcium 1.82 (*)     POC Hematocrit 42      Sample VENOUS      Site Other      Allens Test N/A      DelSys Resusc Bag     ISTAT PROCEDURE - Abnormal    POC PH 7.088 (*)     POC PCO2 43.9      POC PO2 338 (*)     POC HCO3 13.2 (*)     POC BE -17 (*)     POC SATURATED O2 100      POC TCO2 15 (*)     Rate 26      Sample ARTERIAL      Site LR      Allens Test Pass      DelSys Adult Vent      Mode AC/PRVC      Vt 550      PEEP 5      FiO2 100     ISTAT PROCEDURE - Abnormal    POC PH 7.107 (*)     POC PCO2 30.9 (*)     POC PO2 94      POC HCO3 9.8 (*)     POC BE -20 (*)     POC SATURATED O2 94      POC TCO2 11 (*)     Rate 26      Sample ARTERIAL      Site LR      Allens Test Pass      DelSys Adult Vent      Mode AC/PRVC      Vt 550      PEEP 8      FiO2 60     PROTIME-INR - Normal    PT 11.7      INR 1.1     CULTURE, RESPIRATORY   CBC W/ AUTO DIFFERENTIAL    Narrative:     The following orders were created for panel order CBC auto differential.  Procedure                               Abnormality         Status                      ---------                               -----------         ------                     CBC with Differential[[id number]]       Abnormal            Final result                 Please view results for these tests on the individual orders.   B-TYPE NATRIURETIC PEPTIDE    Narrative:     The following orders were created for panel order Brain natriuretic peptide.  Procedure                               Abnormality         Status                     ---------                               -----------         ------                     BNP Performable[[id number]]             Abnormal            Final result                 Please view results for these tests on the individual orders.   HEPATITIS C ANTIBODY   HEP C VIRUS HOLD SPECIMEN   HIV 1 / 2 ANTIBODY   HIV VIRUS CONFIRMATION HOLD SPECIMEN   POCT GLUCOSE MONITORING CONTINUOUS          Imaging Results              CT Abdomen Pelvis With IV Contrast NO Oral Contrast (In process)                      CTA Chest Non-Coronary (PE Studies) (Final result)  Result time 07/28/25 04:40:38      Final result by Rina Strong DO (07/28/25 04:40:38)                   Impression:    IMPRESSION:  1.  No evidence of pulmonary embolism or acute aortic syndrome.  2.  Left lower lobe platelike atelectasis with intraluminal filling defect in the proximal left lower lobe pulmonary bronchus. This may be secondary to mucous plugging or aspiration. Consider bronchoscopy.  3.  Prior granulomatous disease.  4.  Right middle lobe and right lower lobe pulmonary nodules, 4 mm. No follow-up needed if patient is low-risk (and has no known or suspected primary neoplasm). Non-contrast chest CT can be considered in 12 months if patient is high-risk. (Fleischner society recommendation).    -Electronically Signed By: Rina Strong DO   -Electronically Signed On:  7/28/2025 4:40 AM      Report Ends               Narrative:    EXAM: CTA CHEST    HISTORY: Male, age 49 years, Pulmonary embolism (PE)  suspected, unknown D-dimer.    TECHNIQUE: CT angiography of the chest was performed after the administration of a bolus of intravenous contrast. Coronal and sagittal reformats and 3D reconstruction were done and reviewed.    All CT scans are performed using dose optimization techniques as appropriate to a performed exam including automated exposure control and/or standardized protocols for targeted exams where dose is matched to indication/reason for exam/patient size.    COMPARISON: None.    FINDINGS:   Vasculature: There is good opacification of the pulmonary arteries without revealing a large central filling defect or abrupt cut off of peripheral enhancement.  Thoracic aorta and main pulmonary artery are normal diameter.    Lungs: Platelike atelectasis in the left lower lobe seen. Calcified granuloma in the left upper lobe seen. Tiny pulmonary nodule in the right middle lobe measures 4 mm. Additional prominent pulmonary nodule in the lateral aspect of the right lower lobe also seen. No consolidation. No cystic parenchymal changes.   Airways: Intraluminal filling defect within the proximal portion of the left lower lobe pulmonary bronchus is seen. The large airways are patent.  Pleura: No pleural effusion or pneumothorax.  Mediastinum: The heart is normal in size. There is no pericardial effusion. There is no mediastinal or hilar lymphadenopathy. The esophagus and visualized thyroid gland appear normal.  Bone: No fractures or suspicious osseus lesions.   Soft tissue: Normal.                                       CT Cervical Spine Without Contrast (Final result)  Result time 07/28/25 00:23:45      Final result by Jin Castro MD (07/28/25 00:23:45)                   Impression:      No evidence of acute fracture or subluxation involving the cervical spine.      Electronically signed by: Jin Castro  Date:    07/28/2025  Time:    00:23               Narrative:    EXAMINATION:  CT CERVICAL SPINE  WITHOUT CONTRAST    CLINICAL HISTORY:  Found down;    TECHNIQUE:  Low dose axial images, sagittal and coronal reformations were performed though the cervical spine.  Contrast was not administered.    COMPARISON:  None available.    FINDINGS:  No evidence of acute fracture or subluxation involving the cervical spine.  Mild multilevel chronic degenerative changes are present involving the cervical spine.  The paraspinal soft tissues are unremarkable.                                       CT Head Without Contrast (Final result)  Result time 07/28/25 00:20:57      Final result by Jin Castro MD (07/28/25 00:20:57)                   Impression:      Findings concerning for diffuse hypoxic ischemic brain injury.    This critical information above was relayed by myself  by telephone to Dr. Yancey on 07/28/2025 at 00:10.    CRITICAL FINDINGS:  Intracranial ischemia    RECOMMENDATIONS:  Consult/Clinical Evaluation      Electronically signed by: Jin Castro  Date:    07/28/2025  Time:    00:20               Narrative:    EXAMINATION:  CT HEAD WITHOUT CONTRAST    CLINICAL HISTORY:  Mental status change, unknown cause;    TECHNIQUE:  Low dose axial CT images obtained throughout the head without intravenous contrast. Sagittal and coronal reconstructions were performed.    COMPARISON:  None available.    FINDINGS:  Intracranial compartment:    There is sulcal effacement and crowding and subarachnoid cisterns and questionable loss of gray-white differentiation involving the cerebral cortex.  No extra-axial blood or fluid collections.    No parenchymal mass, hemorrhage, edema or major vascular distribution infarct.    Skull/extracranial contents (limited evaluation):    No fracture. Mastoid air cells and paranasal sinuses are essentially clear.  The orbits are unremarkable.                                       X-Ray Chest AP Portable (Final result)  Result time 07/27/25 22:31:50      Final result by Gloria  Jin TILLMAN MD (07/27/25 22:31:50)                   Impression:      Atelectasis versus pneumonia at the right perihilar region.      Electronically signed by: Jin Castro  Date:    07/27/2025  Time:    22:31               Narrative:    EXAMINATION:  XR CHEST AP PORTABLE    CLINICAL HISTORY:  cardic arrest;    TECHNIQUE:  Single frontal view of the chest was performed.    COMPARISON:  None available.    FINDINGS:  Heart size and pulmonary vasculature are within normal limits.  An endotracheal tube is present which terminates superior to the wisam.  An esophageal gastric tube is present which terminates in the body of the stomach.  A focus of increased opacity is noted at the right mid lung field adjacent the right hilum and superior to the right lung base.  No evidence of pneumothorax or pleural effusion.  No evidence of acute osseous process.                                       Medications   NORepinephrine 4 mg in dextrose 5% 250 mL infusion (premix) (0.04 mcg/kg/min × 67.7 kg Intravenous Rate/Dose Change 7/28/25 0202)   propofol (DIPRIVAN) 10 mg/mL infusion (50 mcg/kg/min × 67.7 kg Intravenous New Bag 7/28/25 0257)   sodium chloride 0.9% flush 10 mL (has no administration in time range)   potassium chloride 10 mEq in 100 mL IVPB (has no administration in time range)     And   potassium chloride 10 mEq in 100 mL IVPB (has no administration in time range)     And   potassium chloride 10 mEq in 100 mL IVPB (has no administration in time range)   magnesium sulfate 2g in water 50mL IVPB (premix) (has no administration in time range)   magnesium sulfate 2g in water 50mL IVPB (premix) (has no administration in time range)   sodium phosphate 15 mmol in D5W 250 mL IVPB (has no administration in time range)   sodium phosphate 20.1 mmol in D5W 250 mL IVPB (has no administration in time range)   sodium phosphate 30 mmol in D5W 250 mL IVPB (has no administration in time range)   calcium gluconate 1 g in NS IVPB  (premixed) (has no administration in time range)   calcium gluconate 1 g in NS IVPB (premixed) (0 g Intravenous Stopped 7/28/25 0425)   calcium gluconate 1 g in NS IVPB (premixed) (has no administration in time range)   mupirocin 2 % ointment (1 g Nasal Given 7/28/25 0220)   sodium bicarbonate 150 mEq in D5W 1,000 mL infusion ( Intravenous Rate/Dose Change 7/28/25 0224)   piperacillin-tazobactam (ZOSYN) 4.5 g in D5W 100 mL IVPB (MB+) (4.5 g Intravenous New Bag 7/28/25 0253)   glucose chewable tablet 16 g (has no administration in time range)   glucose chewable tablet 24 g (has no administration in time range)   dextrose 50% injection 12.5 g (has no administration in time range)   dextrose 50% injection 25 g (has no administration in time range)   glucagon (human recombinant) injection 1 mg (has no administration in time range)   insulin aspart U-100 pen 0-5 Units (has no administration in time range)   pantoprazole injection 40 mg (40 mg Intravenous Given 7/28/25 0131)   amiodarone 360 mg/200 mL (1.8 mg/mL) infusion (1 mg/min Intravenous New Bag 7/28/25 0143)   amiodarone 360 mg/200 mL (1.8 mg/mL) infusion (has no administration in time range)   fentaNYL 2500 mcg in 0.9% sodium chloride 250 mL infusion premix (75 mcg/hr Intravenous Verify Only 7/28/25 0259)   calcium gluconate 1 g in NS IVPB (premixed) (0 g Intravenous Hold 7/28/25 0330)     And   calcium gluconate 1 g in NS IVPB (premixed) (has no administration in time range)   dextrose 50% injection 50 g (50 g Intravenous Given 7/28/25 0234)     And   dextrose 50% injection 25 g (has no administration in time range)     And   insulin regular injection 6.77 Units 0.0677 mL (6.77 Units Intravenous Given 7/28/25 0235)   EPINEPHrine 0.1 mg/mL injection (1 mg Intravenous Given 7/27/25 2119)   atropine injection (1 mg Intravenous Given 7/27/25 2118)   calcium chloride 100 mg/mL (10 %) injection (1 g Intravenous Given 7/27/25 2120)   naloxone 0.4 mg/mL injection (2 mg  Intravenous Given 7/27/25 2120)   sodium chloride 0.9% bolus 500 mL 500 mL (0 mLs Intravenous Stopped 7/27/25 2314)   fentaNYL 50 mcg/mL injection 50 mcg (50 mcg Intravenous Given 7/27/25 2325)   iohexoL (OMNIPAQUE 350) injection 100 mL (100 mLs Intravenous Given 7/28/25 0021)   amiodarone in dextrose 150 mg/100 mL (1.5 mg/mL) loading dose 150 mg (0 mg Intravenous Stopped 7/28/25 0127)   albuterol nebulizer solution 10 mg (10 mg Nebulization Given 7/28/25 0254)   sodium bicarbonate 8.4 % (1 mEq/mL) injection 50 mEq (50 mEq Intravenous Given 7/28/25 0234)     Medical Decision Making  Amount and/or Complexity of Data Reviewed  Labs: ordered.  Radiology: ordered.    Risk  Prescription drug management.  Decision regarding hospitalization.      YAMILE Burciaga is a 49 year old male patient who presents to the ED s/p cardiac arrest.  History concerning for prolonged pre-hospital arrest.  On arrival, intubated on backboard with cervical collar in place.  Coarse breath sounds bilaterally with bagging.  Bradycardic initially on auscultation.  Unresponsive, GCS 3 without sedation.  Abdomen distended, but overall soft.  Shortly after arrival, patient continued to become more bradycardic, given atropine at bedside, then shortly after lost pulses.  Compressions initiated.  Patient given 1 dose of epi, calcium.  Pulse check, patient with ROSC.  Patient hypotensive to SBP 70s to 90s, started on Levophed with improvement.  Patient placed on propofol for sedation.  Workup included CBC, CMP, Mg, coags, BNP, troponin, UA, UDS, ethanol along with CXR, CT head, cervical spine.  Patient without leukocytosis, WBC 10.06.  H/H 14.4/45.3.  Coags WNL.  Hyperglycemic to 218 on arrival.  CO2 decreased to 22.  AST/ALT elevated to 367, 220.  Initially with anion gap of 19.  Initial VBG with pH of 6.906, pCO2 of 100.8, PO2 97, bicarb of 20.  Repeat gas with pH of 7.088.  PCO2 43.9, PO2 of 338, bicarb of 13.2.  BNP elevated at 208.   Troponin elevated to twenty-four, then 133.2.  Ethanol 243.  UDS otherwise negative.  UA with blood.  CXR with ET tube and OG tube in place.  Right perihilar opacity noted.  CT head with  sulcal effacement and crowding and subarachnoid cisterns and questionable loss of gray-white differentiation involving the cerebral cortex, concerning for hypoxic injury.  CT cervical spine unremarkable.  Patient consulted hospital team for ICU admission.    Pinky Yancey MD  07/28/2025 4:46 AM                                        Clinical Impression:  Final diagnoses:  [R07.9] Chest pain          ED Disposition Condition    Admit                       [1]         Pinky Yancey MD  07/28/25 0408

## 2025-07-28 NOTE — ASSESSMENT & PLAN NOTE
Reportedly had ventricular fibrillation in the field, status post cardioversion   We will start patient on amiodarone drip, cardiology consulted

## 2025-07-28 NOTE — PLAN OF CARE
Critical access hospital  Initial Discharge Assessment       Primary Care Provider: No primary care provider on file.    Admission Diagnosis: Chest pain [R07.9]    Admission Date: 7/27/2025  Expected Discharge Date:     SW went to patients room, patient is intubated. Ex-Wife, minor kids, parents, aunts and cousins was present in the room assisting with assessment. SW verified demographics, insurance, supports, and PCP.  Family reported they were not sure who his PCP was. Patient is able to complete ADLs independently prior to hospital admission. Family verified at home DME: None.  Family verified No HH, No Dialysis, No Blood Thinners, and No Oxygen.     Family verified pharmacy of choice: Family doesn't know  Family confirmed they will be source of transportation at the time of discharge.     Transition of Care Barriers: None    Payor: BLUE CROSS BLUE SHIELD / Plan: [place] PPO / Product Type: PPO /     Extended Emergency Contact Information  Primary Emergency Contact: Ameya Gaitan  Home Phone: [phone]  Mobile Phone: [phone]  Relation: Relative  Preferred language: English  Secondary Emergency Contact: Avelino Levy  Home Phone: [phone]  Mobile Phone: [phone]  Relation: Relative  Preferred language: English   needed? No    Discharge Plan A: Home  Discharge Plan B: Home    No Pharmacies Listed    Initial Assessment (most recent)       Adult Discharge Assessment - 07/28/25 1613          Discharge Assessment    Assessment Type Discharge Planning Assessment     Confirmed/corrected address, phone number and insurance Yes     Confirmed Demographics Correct on Facesheet     Source of Information family     Communicated CASIMIRO with patient/caregiver Date not available/Unable to determine     People in Home alone     Do you expect to return to your current living situation? Yes     Do you have help at home or someone to help you manage your care at home? No     Prior to hospitilization  cognitive status: Coma/Sedated/Intubated     Current cognitive status: Coma/Sedated/Intubated     Walking or Climbing Stairs Difficulty no     Dressing/Bathing Difficulty no     Equipment Currently Used at Home none     Readmission within 30 days? No     Patient currently being followed by outpatient case management? No     Do you currently have service(s) that help you manage your care at home? No     Do you take prescription medications? Yes     Do you have prescription coverage? Yes     Do you have any problems affording any of your prescribed medications? No     Is the patient taking medications as prescribed? yes     Who is going to help you get home at discharge? FAMILY     Are you on dialysis? No     Do you take coumadin? No     Discharge Plan A Home     Discharge Plan B Home     DME Needed Upon Discharge  none     Discharge Plan discussed with: Parent(s);Sibling     Transition of Care Barriers None

## 2025-07-28 NOTE — PROGRESS NOTES
Attempted to call pt's Mother [phone] [place]. No answer, left message. PC Team will continue to follow.

## 2025-07-28 NOTE — HPI
Patient is a 49-year-old male with history of alcohol use presents to the emergency room for cardiac arrest.  Patient intubated and sedated on examination, history supplemented by ED provider.  Patient reportedly has been drinking heavily for the last 3-4 days, apparently this happens whenever patient's son returns back to his ex-wife.  Reportedly family member was outside for about 15 minutes and came back in and found the patient lying on the kitchen floor, unresponsive.  Per patient's cousin at bedside states a family member started CPR and called 911.  Upon arrival by EMS patient was pulseless, was given epi, had shockable rhythm and was shocked once.  ROSC achieved after about 20 minutes.  Patient again then had another cardiac arrest in the emergency room and ROCS was achieved shortly after.  Labs showed anion gap 19, glucose 218, Mag 2.9, elevated liver enzymes, elevated BNP and troponin, no ST elevations on EKG.  Initial ABG showed respiratory and metabolic acidosis.  Patient had blood-tinged output from OG tube.  Chest x-ray showing findings of possible pneumonia.  CT head, cervical spine, abdomen and pelvis and chest pending.

## 2025-07-28 NOTE — H&P
[facility] - Emergency Dept  Hospital Medicine  History & Physical    Patient Name: Huber Burciaga  MRN: [id number]  Patient Class: IP- Inpatient  Admission Date: 7/27/2025  Attending Physician: Pinky Yancey MD   Primary Care Provider: No primary care provider on file.         Patient information was obtained from ER records.     Subjective:     Principal Problem:Cardiac arrest    Chief Complaint:   Chief Complaint   Patient presents with    Cardiac Arrest     Family reports Pt with heavy drinking over the last 3-4 days was found down in his kitchen. Ems states no pulse on arrival at 2031, morris applied at 2032, Epi x 2 pt in vfib, pt shocked, epi x1. ROSC at 2053        HPI: Patient is a 49-year-old male with history of alcohol use presents to the emergency room for cardiac arrest.  Patient intubated and sedated on examination, history supplemented by ED provider.  Patient reportedly has been drinking heavily for the last 3-4 days, apparently this happens whenever patient's son returns back to his ex-wife.  Reportedly family member was outside for about 15 minutes and came back in and found the patient lying on the kitchen floor, unresponsive.  Per patient's cousin at bedside states a family member started CPR and called 911.  Upon arrival by EMS patient was pulseless, was given epi, had shockable rhythm and was shocked once.  ROSC achieved after about 20 minutes.  Patient again then had another cardiac arrest in the emergency room and ROCS was achieved shortly after.  Labs showed anion gap 19, glucose 218, Mag 2.9, elevated liver enzymes, elevated BNP and troponin, no ST elevations on EKG.  Initial ABG showed respiratory and metabolic acidosis.  Patient had blood-tinged output from OG tube.  Chest x-ray showing findings of possible pneumonia.  CT head, cervical spine, abdomen and pelvis and chest pending.    No past medical history on file.    No past surgical history on  file.    Review of patient's allergies indicates:  Not on File    No current facility-administered medications on file prior to encounter.     No current outpatient medications on file prior to encounter.     Family History    None       Tobacco Use    Smoking status: Not on file    Smokeless tobacco: Not on file   Substance and Sexual Activity    Alcohol use: Not on file    Drug use: Not on file    Sexual activity: Not on file     Review of Systems   Unable to perform ROS: Acuity of condition     Objective:     Vital Signs (Most Recent):  Temp: (!) 94.4 °F (34.7 °C) (07/27/25 2259)  Pulse: 107 (07/27/25 2353)  Resp: (!) 26 (07/27/25 2353)  BP: (!) 89/55 (07/27/25 2143)  SpO2: (!) 93 % (07/27/25 2353) Vital Signs (24h Range):  Temp:  [94.4 °F (34.7 °C)] 94.4 °F (34.7 °C)  Pulse:  [] 107  Resp:  [26-34] 26  SpO2:  [93 %-100 %] 93 %  BP: (80-89)/(49-55) 89/55     Weight: 67.7 kg (149 lb 4 oz)  Body mass index is 22.69 kg/m².     Physical Exam  Vitals reviewed.   Constitutional:       Appearance: He is ill-appearing.   HENT:      Head: Atraumatic.   Eyes:      Comments: Pupils dilated and nonreactive to light   Cardiovascular:      Rate and Rhythm: Regular rhythm. Tachycardia present.   Pulmonary:      Breath sounds: No wheezing.      Comments: Mechanical breath sounds  Abdominal:      General: There is distension.   Musculoskeletal:         General: No swelling.   Skin:     General: Skin is dry.   Neurological:      Comments: Sedated                   Significant Labs: All pertinent labs within the past 24 hours have been reviewed.  CBC:   Recent Labs   Lab 07/27/25 2127 07/27/25 2143   WBC  --  10.06   HGB  --  14.4   HCT 42 45.3   PLT  --  240     CMP:   Recent Labs   Lab 07/27/25 2143      K 4.1      CO2 22*   *   BUN 14   CREATININE 1.4   CALCIUM 8.1*   PROT 6.0   ALBUMIN 3.7   BILITOT 0.3   ALKPHOS 91   *   *   ANIONGAP 19*       Significant Imaging: I have reviewed all  pertinent imaging results/findings within the past 24 hours.  Assessment/Plan:     Assessment & Plan  Cardiac arrest  Concern for possible aspiration causing hypoxia leading to cardiac arrest   Reportedly had episode of vfib s/p DCCV with EMS  Follow up echocardiogram   CT head showing findings of hypoxic brain injury, overall prognosis poor   Continue Levophed, wean as tolerated   Continue mechanical ventilation, pulmonology consulted  Cardio consulted     Patient's cousin Ameya is contact   Aspiration pneumonia  Possible aspiration pneumonia seen on chest x-ray  Continue with empiric antibiotics   Follow up sputum cultures  Follow up CTA chest  Follow up procalcitonin    Acute respiratory failure with hypoxia and hypercarbia  Patient with Hypercapnic and Hypoxic Respiratory failure which is Acute.  he is not on home oxygen. Supplemental oxygen was provided and noted- Vent Mode: A/C  Oxygen Concentration (%):  [] 60  Resp Rate Total:  [26 br/min-34 br/min] 34 br/min  Vt Set:  [550 mL] 550 mL  PEEP/CPAP:  [5.8 cmH20-9.9 cmH20] 9.9 cmH20  Mean Airway Pressure:  [13 adZ88-92 cmH20] 14 cmH20    .   Signs/symptoms of respiratory failure include- increased work of breathing and respiratory distress. Contributing diagnoses includes - Aspiration Labs and images were reviewed. Patient Has recent ABG, which has been reviewed. Will treat underlying causes and adjust management of respiratory failure as follows- continue mechanical ventilation   Pulmonology consulted   Repeat ABG   Repeat chest x-ray in the AM    Metabolic acidosis, increased anion gap  Likely secondary to elevated lactic acid  Started on bicarb drip  Trend lactic acid    Transaminitis  Likely liver injury secondary to shock  Repeat CMP    Ventricular fibrillation  Reportedly had ventricular fibrillation in the field, status post cardioversion   We will start patient on amiodarone drip, cardiology consulted  Alcohol use  Binge drinking in the last  several days  Alcohol level 243 on admission    Elevated troponin  secondary to cardiac arrest   Trend troponin   Cardiology consulted    Shock  Cardiogenic shock  Cont levo  F/u echo  Anoxic brain injury  Seen on CTH  Poor prognosis     VTE Risk Mitigation (From admission, onward)           Ordered     IP VTE HIGH RISK PATIENT  Once         07/27/25 2356     Place sequential compression device  Until discontinued         07/27/25 3244                                                Isabel Kumar MD  Department of Hospital Medicine  [facility] - Emergency Dept

## 2025-07-28 NOTE — ASSESSMENT & PLAN NOTE
Concern for possible aspiration causing hypoxia leading to cardiac arrest   Reportedly had episode of vfib s/p DCCV with EMS  Follow up echocardiogram   CT head showing findings of hypoxic brain injury, overall prognosis poor   Continue Levophed, wean as tolerated   Continue mechanical ventilation, pulmonology consulted  Cardio consulted     Patient's cousin Ameya is contact

## 2025-07-28 NOTE — HPI
"Per admit H&P: "Patient is a 49-year-old male with history of alcohol use presents to the emergency room for cardiac arrest. Patient intubated and sedated on examination, history supplemented by ED provider. Patient reportedly has been drinking heavily for the last 3-4 days, apparently this happens whenever patient's son returns back to his ex-wife. Reportedly family member was outside for about 15 minutes and came back in and found the patient lying on the kitchen floor, unresponsive. Per patient's cousin at bedside states a family member started CPR and called 911. Upon arrival by EMS patient was pulseless, was given epi, had shockable rhythm and was shocked once. ROSC achieved after about 20 minutes. Patient again then had another cardiac arrest in the emergency room and ROCS was achieved shortly after. Labs showed anion gap 19, glucose 218, Mag 2.9, elevated liver enzymes, elevated BNP and troponin, no ST elevations on EKG. Initial ABG showed respiratory and metabolic acidosis. Patient had blood-tinged output from OG tube. Chest x-ray showing findings of possible pneumonia. CT head, cervical spine, abdomen and pelvis and chest pending."    He is currently admitted to the ICU after cardiac arrest.  Initial workup has revealed what appears to be a hypoxic brain injury.  Unfortunately course has been complicated by multiorgan failure; shock on multiple vasopressor support, respiratory failure on invasive mechanical ventilation, renal failure, and acute ischemic hepatitis.  He is critically ill with a guarded and potentially very poor prognosis.  I have been asked to assist with goals of care.  "

## 2025-07-28 NOTE — PLAN OF CARE
Patient admitted to ICU intubated/sedated. Unresponsive, no purposeful movement, negative cough/gag/corneal reflexes. Neil catheter in place, minimal UOP. Rectal tube in place, red-brown liquid stool noted. All critical results d/w MD. Temp elevated, 106 rectal, cooling blanket applied, IV tylenol given. BP labile, rapid titrations made to stabilize patient.

## 2025-07-28 NOTE — ASSESSMENT & PLAN NOTE
Possible aspiration pneumonia seen on chest x-ray  Continue with empiric antibiotics   Follow up sputum cultures  Follow up CTA chest  Follow up procalcitonin

## 2025-07-28 NOTE — SUBJECTIVE & OBJECTIVE
"HPI:  49-year-old male with a history of alcohol use disorder presented to the emergency department following an out-of-hospital cardiac arrest. The patient is currently intubated and sedated; history is supplemented by emergency medical services and family at bedside. Per collateral history, the patient had reportedly been consuming large quantities of alcohol over the preceding 3-4 days, a pattern that recurs in the context of psychosocial stressors. A family member found the patient unresponsive on the kitchen floor after an estimated 15-minute interval during which the patient was alone. Immediate bystander CPR was initiated, and EMS were activated. On EMS arrival, the patient was pulseless; advanced cardiac life support was initiated, including administration of epinephrine and one defibrillation for a shockable rhythm. Return of spontaneous circulation (ROSC) was achieved after approximately 20 minutes of resuscitation. The patients history of heavy alcohol use raises concern for metabolic derangements, including hypoglycemia, electrolyte disturbances, and possible alcohol-related ketoacidosis.     Images personally reviewed and interpreted:  Study: Head CT  Study Interpretation:   "Impression:     Findings concerning for diffuse hypoxic ischemic brain injury."    Additional studies reviewed:   Study: Cardiac_Neuro: EKG  Study Interpretation: sinus tachycardia    Laboratory studies reviewed:  BMP:   Lab Results   Component Value Date     07/28/2025    K 2.8 (LL) 07/28/2025     07/28/2025    CO2 20 (L) 07/28/2025    BUN 29 (H) 07/28/2025    CREATININE 3.7 (H) 07/28/2025    CALCIUM 7.5 (L) 07/28/2025     CBC:   Lab Results   Component Value Date    WBC 10.76 07/28/2025    RBC 5.40 07/28/2025    HGB 17.3 07/28/2025    HCT 49.4 07/28/2025    HCT 53 07/28/2025     07/28/2025    MCV 92 07/28/2025    MCH 32.0 (H) 07/28/2025    MCHC 35.0 07/28/2025       Documentation personally reviewed:  Notes: " Notes: ED notes and H&P     Assessment and plan:  49-year-old male with a history of alcohol use disorder presented after out-of-hospital cardiac arrest, with ROSC achieved after approximately 20 minutes of resuscitation and a subsequent recurrent arrest in the ED. The patient is currently intubated and sedated. History is supplemented by ED staff and family. The arrest was witnessed, with bystander CPR initiated promptly. The patient had reportedly been drinking heavily for several days prior to the event. Initial labs revealed an anion gap metabolic acidosis, hyperglycemia, elevated liver enzymes, elevated BNP and troponin, and blood-tinged output from OG tube. Chest X-ray suggests possible pneumonia. Initial ABG demonstrated mixed respiratory and metabolic acidosis.       Post charge discharge plan:  Clinic follow up: Other: TBD    Visit Type: N/A  Timeframe: N/A

## 2025-07-29 NOTE — CONSULTS
Spoke with patients nurse Miller, unable to turn or move to assess at this time  due to condition.

## 2025-07-29 NOTE — CODE DOCUMENTATION
Nocturnists    Emergently attended to the patient's code.  As discussed before, patient is a 49-year-old gentleman who coded for about 20 minutes and now is in critical condition in the ICU.  I had extensive conversation with the family, at the time, and the patient was to be a full code.    Patient did code (PEA) for approximately 3 minutes and underwent resuscitation with successful ROSC via ACLS protocol.  He did get an amp of bicarbonate; glucose was 134.    In light of this additional code, I had directed conversations with the family on the unit.  It was explained the patient did terminally deteriorate (die) briefly, but that he has had successful resuscitation (for now).  However, in very plain terms, I impressed upon them the risk of this happening again and that the more we code him he will have progressive anoxic injury, particularly to brain.    We did talk about declaring somebody formally as brain dead but my understanding is that this exhaustive workup/clinical assessment has not yet occurred.  Indeed, the patient is too unstable to undergo this protocol.      However, as I explained to the family, should he terminally deteriorate again (and there is an extremely high-risk of this happening again), this physician would code this patient no more than 20 minutes, because at that point, the patient would have declared himself as terminal, with no chance of meaningful recovery (at that point he would have progressive anoxic brain injury already on top of what we suspect is him already having preexistent devastating anoxic brain injury).  Therefore, I would cease coding efforts.    In light of the patient's continued deterioration, family is now conferencing further regarding what their wishes would be regarding his code status.  Note that the patient's mother whom I left a message for has not yet contacted me back.  Patient in critical condition, will closely monitor.    Critical Care time: 10:06  p.m.-10:37 p.m., for a total of 31 minutes of critical care time (running the code, discussion with nursing staff, and family conference)

## 2025-07-29 NOTE — CONSULTS
Patient  prior to visit.     Xenia Up MD MPH  [facility] [address] [address]  [phone] (p)  [phone] (f)

## 2025-07-29 NOTE — PLAN OF CARE
Nocturnists    Events noted.  Chart reviewed.  Discussed with nursing staff and family at bedside.    49-year-old  male with a history of alcohol abuse who presented after a cardiac arrest (approximately 20 minutes).  Since his code, he is now sedated/intubated but, regrettably, nursing staff report that pupils are fixed/dilated and there has been a loss of cranial nerves.    On night shift, we were told that conversations had been had with the family regarding code status and that they had desired DNR.  As this was my first opportunity contributing to this patient's case, I reviewed the chart, discussed with nursing staff, and conferenced with family at bedside.    At this time, patient is not decisional nor has a formal power-of-.  He does not have a wife and has no adult children.  He does have a mother (Kamilah) but she is not locatable via phone (I did call and leave a message for her to return my call, which has not yet occurred); multiple family members report that she has dementia and possibly alcohol abuse herself and has been estranged from her son, the patient.    One relative, an aunt at bedside, raised him and is the leader of the family.  At this time, we discussed his critically ill case.  The concern for anoxic brain injury was discussed, that happened as a result of him terminally deteriorating, thus necessitating the code.    In speaking with family, they report they have not yet made a decision regarding his code status and would like to convene amongst themselves further.  Thus, for now, patient will remain a FULL CODE.  Support offered by myself and  at bedside, as well.    Please note: Critical care time began at 8:03 p.m. and paused at 8:11 p.m.; it resumed at 8:59 p.m. and was completed in its entirety at 9:28 p.m., for a total of 37 minutes of critical care time.  This includes review of the medical record, ongoing discussion with nursing staff as well as the  , attempt to call the mother via phone, and bedside family conference.

## 2025-07-29 NOTE — CARE UPDATE
07/28/25 1910   Patient Assessment/Suction   Level of Consciousness (AVPU) unresponsive   Respiratory Effort Moderate   Expansion/Accessory Muscles/Retractions abdominal muscle use;no retractions;expansion symmetric   All Lung Fields Breath Sounds coarse;equal bilaterally   Rhythm/Pattern, Respiratory assisted mechanically   Cough Frequency no cough   Cough Type assisted   Suction Method tracheal   $ Suction Charges Inline Suction Procedure Stat Charge   Secretions Amount moderate   Secretions Color tan;red-streaked   Secretions Characteristics thin   Skin Integrity   $ Wound Care Tech Time 15 min   Area Observed Upper lip;Lower lip;Corner lip;Cheek   Skin Appearance without discoloration   PRE-TX-O2   Device (Oxygen Therapy) ventilator   $ Is the patient on High Flow Oxygen? Yes   Pulse Oximetry Type Continuous   $ Pulse Oximetry - Multiple Charge Pulse Oximetry - Multiple        Airway - Non-Surgical 07/27/25   Placement Date: 07/27/25   Present Prior to Hospital Arrival?: Yes  Inserted by: EMS  Airway Device Size: 7.0  Style: Cuffed  Cuffed: Cuffed  Cuff Inflated With: Air  Depth of Insertion (cm): 25  Secured at: Teeth   Secured at 25 cm   Measured At Teeth   Secured Location Center   Secured by Commercial tube cordova   Position of ETT in xRay In good position   Tube Securement Device Changed? Yes   Bite Block secure and patent   Site Condition Cool;Dry   Status Intact;Secured;Patent   Site Assessment Clean;Dry   Cuff Volume Green Zone 18 - 26 cmH20   Airway Safety   Is Ambu Bag and Mask with Patient? Yes, Adult Ambu Bag and Mask   Suction set is at the bedside? Yes   Vent Select   Conventional Vent Y   Charged w/in last 24h YES   Preset Conventional Ventilator Settings   Vent ID [id number]   Vent Type    Humidity HME   Insp Rise Time  70 %   Conventional Ventilator Alarms   Alarms On Y   Ve High Alarm 23 L/min   Ve Low Alarm 3 L/min   Vt High Alarm 1200 mL   Vt Low Alarm 200 mL   Education   $ Education 15  min;Ventilator Oxygen;Suction

## 2025-07-29 NOTE — CARE UPDATE
Spoke with family last night and this morning. Code blue was called yesterday evening approx 2200. Nocturnist spoke with family and they did not agree to DNR prior to leaving the hospital. Sedation has been off since the code and patient is not responding. Pupils are fixed and dilated. Hemodynamics still not stable and likely to code again with recent LA 19.0.  I spoke with the patient's mother, aunt Shilpa, cousin and brother. THey are aware that he is actively dying and we are not going to do chest compressions and escalate care today. I recommended that he be given morphine for transition to comfort. Family would like for his children, 17 year old daughter and 14 year old son be here. We will honor their wishes.  Palliative care consulted for emotion al support.  did a blessing last night. DNR placed in chart.

## 2025-07-29 NOTE — HOSPITAL COURSE
"Patient is a 49-year-old male with history of alcohol use presents to the emergency room for cardiac arrest. Patient intubated and sedated on examination, history supplemented by ED provider. Patient reportedly has been drinking heavily for the last 3-4 days, apparently this happens whenever patient's son returns back to his ex-wife. Reportedly family member was outside for about 15 minutes and came back in and found the patient lying on the kitchen floor, unresponsive. Per patient's cousin at bedside states a family member started CPR and called 911. Upon arrival by EMS patient was pulseless, was given epi, had shockable rhythm and was shocked once. ROSC achieved after about 20 minutes. Patient again then had another cardiac arrest in the emergency room and ROCS was achieved shortly after. Labs showed anion gap 19, glucose 218, Mag 2.9, elevated liver enzymes, elevated BNP and troponin, no ST elevations on EKG. Initial ABG showed respiratory and metabolic acidosis. Patient had blood-tinged output from OG tube. Chest x-ray showing findings of possible pneumonia. CT head, cervical spine, abdomen and pelvis and chest pending."     He is currently admitted to the ICU after cardiac arrest.  Initial workup has revealed what appears to be a hypoxic brain injury.  Unfortunately course has been complicated by multiorgan failure; shock on multiple vasopressor support, respiratory failure on invasive mechanical ventilation, renal failure, and acute ischemic hepatitis.  He is critically ill with a guarded and potentially very poor prognosis.      He unfortunately had another PEA arrest last night. Per Dr. Erika Olsen code note:  Patient did code (PEA) for approximately 3 minutes and underwent resuscitation with successful ROSC via ACLS protocol.  He did get an amp of bicarbonate.    Family gathered this morning and were updated on last night's events and today's worsening labs.  Lactic acid 25.4.  They are in agreement that we " will not escalate care. DNR in place.     Called to bedside with asystole on monitor. On evaluation, he had no palpable pulse, no audible heart tones, pupils fixed dilated and nonreactive. Death pronounced at 12:08pm.  Family at bedside and condolences given.

## 2025-07-29 NOTE — CARE UPDATE
07/29/25 0910   Patient Assessment/Suction   Level of Consciousness (AVPU) unresponsive   PRE-TX-O2   Device (Oxygen Therapy) ventilator   Oxygen Concentration (%) (S)  85  (increased due to decreased Po2 on ABG)   SpO2 (!) 46 %   Pulse (!) 28   Resp (!) 28   Vent Select   Charged w/in last 24h YES   Preset Conventional Ventilator Settings   Ventilation Type VC   Vent Mode A/C   Set Rate 28 BPM   Vt Set 450 mL   PEEP/CPAP (S)  8 cmH20  (Increased PEEP to +8)   Peak Flow 60 L/min   Peak End Inspiratory Pressure 41 cmH20   Plateau Set/Insp. Hold (sec) 0   I-Trigger Type  V-TRIG   Trigger Sensitivity Flow/I-Trigger 5 L/min   Patient Ventilator Parameters   Resp Rate Total 28 br/min   Peak Airway Pressure 42 cmH20   Mean Airway Pressure 20 cmH20   Plateau Pressure 0 cmH20   Exhaled Vt 476 mL   Total Ve 13.3 L/m   I:E Ratio Measured 1:1.6   Auto PEEP 0 cmH20   Conventional Ventilator Alarms   Resp Rate High Alarm 45 br/min   Apnea Rate 16   Apnea Volume (mL) 500 mL   Apnea Oxygen Concentration  100   Apnea Flow Rate (L/min) 50   T Apnea 20 sec(s)   Ready to Wean/Extubation Screen   FIO2<=50 (chart decimal) (!) 0.85   MV<16L (chart vol.) 13.3   PEEP <=8 (chart #) 8   Ready to Wean Parameters   F/VT Ratio<105 (RSBI) (!) 58.82

## 2025-07-29 NOTE — PLAN OF CARE
"Dx: Cardiac arrest    Shift Events: PEA arrest this shift - see code blue documentation. Sedation off since approx 2230, patient remains unresponsive and no cough/gag/corneal reflexes noted. Nocturnist, Dr. Olsen, had in-depth conversation with family regarding code status - see physician note. Critical/relevant lab results d/w MD. BP and rectal temp labile throughout night. Patient remains in critical condition. Dr. Mar updated on patient and AM abg. EEG technician notified of order, stated will perform this morning.     Neuro: Unresponsive    Vital Signs: /70 Comment: arterial line  Pulse 91   Temp 98.9 °F (37.2 °C) (Core Rectal)   Resp (!) 28   Ht 5' 8" (1.727 m)   Wt 67.7 kg (149 lb 4 oz)   SpO2 (!) 83%   BMI 22.69 kg/m²     Respiratory: Ventilator    Diet: NPO    Gtts: Propfol, Fentanyl, Norepinephrine, Vasopressin, Octreotide, MIVF, and Abx, sodium bicarb    Urine Output: Urinary Catheter 0 cc/shift    Drains:   NG/OG Tube 200 cc /  shift  Rectal Tube 800 cc/ shift            "

## 2025-07-29 NOTE — PROCEDURES
EEG REPORT      Huber Burciaga  [id number]  1975    DATE OF SERVICE: 7/29/2025         METHODOLOGY      Extended electroencephalographic recording is made while the patient is ambulatory and continuing normal daily activities.  Electrodes are placed according to the International 10-20 placement system and included T1 and T2 electrode placement.  Twenty four (24) channels of digital signal (sampling rate of 512/sec) was simultaneously recorded from the scalp including EKG and eye monitors.  Recording band pass was 0.1 to 100 hz and all data was stored digitally on the recorder.  The patient is instructed to press an event button when clinical symptoms occur and write the symptoms into a diary. Activation procedures which include photic stimulation, hyperventilation and instructing patients to perform simple task are done in selected patients.        The EEG is displayed on a monitor screen and can be reformatted into different montages for evaluation.  The entire recoding is submitted for computer assisted analysis to detect spike and electrographic seizure activity.  The entire recording is visually reviewed and the times identified by computer analysis as being spikes or seizures are reviewed again.  Compresses spectral analysis (CSA) is also performed on the activity recorded from each individual channel.  This is displayed as a power display of frequencies from 0 to 30 Hz over time.   The CSA analysis is done and displayed continuously.  This is reviewed for asymmetries in power between homologous areas of the scalp and for presence of changes in power which canbe seen when seizures occur.  Sections of suspected abnormalities on the CSA is then compared with the original EEG recording.  .     [employer] software was also utilized in the review of this study.  This software suite analyzes the EEG recording in multiple domains.  Coherence and rhythmicity is computed to identify EEG sections which may  contain organized seizures.  Each channel undergoes analysis to detect presence of spike and sharp waves which have special and morphological characteristic of epileptic activity.  The routine EEG recording is converted from spacial into frequency domain.  This is then displayed comparing homologous areas to identify areas of significant asymmetry.  Algorithm to identify non-cortically generated artifact is used to separate eye movement, EMG and other artifact from the EEG     Recording Times    A total of 00:25:18 hours of EEG was recorded.      EEG FINDINGS:  Background activity:   The background rhythm was characterized by low voltage polymorphic delta activity.   Symmetry and continuity: the background was continuous and symmetric     Sleep:   No sleep transients were seen.    Activation procedures:  NA    Abnormal activity:   No epileptiform discharges, periodic discharges, lateralized rhythmic delta activity or electrographic seizures were seen.    IMPRESSION:   Abnormal EEG due to moderate to severe loss or suppression of cortical activity with no electrographic seizures or indications of seizure tendency.      Segundo Manning MD  Neurology-Epilepsy.  [facility].

## 2025-07-29 NOTE — PROGRESS NOTES
Pulmonary/Critical Care Consult      PATIENT NAME: Huber Burciaga  MRN: [id number]  TODAY'S DATE: 2025  11:45 AM  ADMIT DATE: 2025  AGE: 49 y.o. : 1975    CONSULT REQUESTED BY: Phyllis Taylor MD    REASON FOR CONSULT:   Cardiac arrest    HPI:   is a 49-year-old man who has a known history of heavy alcohol use, presented with cardiac arrest after being found unresponsive in the kitchen floor by his family.  He received bystander CPR for about 15-20 minutes prior to EMS arrival, where they continued to performed CPR.  Patient was found to be in V fib.  The patient was     Intubated and brought to the emergency room by EMS.      The patient's family reports that he has a pretty strong history of heavy alcohol use.      Today:   Continues to be critically ill, liver enzymes have suddenly climbed to the thousands, he had another cardiac arrest around 11:00 p.m..  EEG was progress this morning      Review of Systems   Unable to perform ROS: Intubated           ALLERGIES  Review of patient's allergies indicates:  No Known Allergies    INPATIENT SCHEDULED MEDICATIONS   calcium gluconate IVPB  1 g Intravenous Once    mupirocin   Nasal BID    pantoprazole  40 mg Intravenous BID    piperacillin-tazobactam (Zosyn) IV (PEDS and ADULTS) (extended infusion is not appropriate)  4.5 g Intravenous Q12H      amiodarone in dextrose 5%  0.5 mg/min Intravenous Continuous   Held at 25 0645    fentanyl  0-250 mcg/hr Intravenous Continuous   Stopped at 25 2230    lactated ringers   Intravenous Continuous 250 mL/hr at 25 1107 Rate Verify at 25 1107    NORepinephrine bitartrate-D5W  0-3 mcg/kg/min Intravenous Continuous 76.2 mL/hr at 25 1107 2.4 mcg/kg/min at 25 1107    octreotide (SANDOSTATIN) 500 mcg in 0.9% NaCl 100 mL infusion  50 mcg/hr Intravenous Continuous 10 mL/hr at 25 1107 50 mcg/hr at 25 1107    propofoL  0-50 mcg/kg/min Intravenous Continuous    Stopped at 07/28/25 2207    sodium bicarbonate 150 mEq in D5W 1,000 mL infusion   Intravenous Continuous 150 mL/hr at 07/29/25 1107 Rate Verify at 07/29/25 1107    vasopressin  0.04 Units/min Intravenous Continuous 12 mL/hr at 07/29/25 1107 0.04 Units/min at 07/29/25 1107       MEDICAL AND SURGICAL HISTORY  No past medical history on file.  No past surgical history on file.    ALCOHOL, TOBACCO AND DRUG USE  Tobacco Use History[1]  Social History     Substance and Sexual Activity   Alcohol Use Not on file     Social History     Substance and Sexual Activity   Drug Use Not on file       FAMILY HISTORY  No family history on file.    VITAL SIGNS (MOST RECENT)  Temp: 96.5 °F (35.8 °C) (07/29/25 1101)  Pulse: 92 (07/29/25 1145)  Resp: (!) 28 (07/29/25 1145)  BP: 100/70 (arterial line) (07/28/25 1901)  SpO2:  (unable to obtain. Dr. Mar and Dr. Taylor aware.) (07/29/25 1101)    INTAKE AND OUTPUT (LAST 24 HOURS):  Intake/Output Summary (Last 24 hours) at 7/29/2025 1312  Last data filed at 7/29/2025 1107  Gross per 24 hour   Intake 18987.29 ml   Output 1565 ml   Net 9234.29 ml       WEIGHT  Wt Readings from Last 1 Encounters:   07/27/25 67.7 kg (149 lb 4 oz)       Physical Exam  Vitals reviewed.   Constitutional:       General: He is in acute distress.      Appearance: He is well-developed. He is ill-appearing, toxic-appearing and diaphoretic.   HENT:      Head: Normocephalic and atraumatic.      Mouth/Throat:      Pharynx: No oropharyngeal exudate or posterior oropharyngeal erythema.   Eyes:      General: No scleral icterus.     Pupils: Pupils are equal, round, and reactive to light.   Neck:      Vascular: No JVD.   Cardiovascular:      Rate and Rhythm: Normal rate and regular rhythm.      Heart sounds: Normal heart sounds. No murmur heard.  Pulmonary:      Effort: Pulmonary effort is normal. No respiratory distress.      Breath sounds: Rales present. No wheezing.   Abdominal:      General: Bowel sounds are normal.  "There is distension.      Palpations: Abdomen is soft.      Tenderness: There is no abdominal tenderness.   Musculoskeletal:         General: No swelling.      Cervical back: Normal range of motion and neck supple. No rigidity.   Skin:     General: Skin is warm.      Capillary Refill: Capillary refill takes less than 2 seconds.      Coloration: Skin is pale.      Findings: No rash.   Neurological:      General: No focal deficit present.      Mental Status: He is alert and oriented to person, place, and time.      Cranial Nerves: No cranial nerve deficit.      Motor: No weakness or abnormal muscle tone.           ACUTE PHASE REACTANT (LAST 24 HOURS)  No results for input(s): "FERRITIN", "CRP", "LDH", "DDIMER" in the last 24 hours.    CBC LAST (LAST 24 HOURS)  Recent Labs   Lab 07/29/25  0246 07/29/25  0326   WBC 6.81  --    RBC 4.46*  --    HGB 14.8  --    HCT 41.0 40   MCV 92  --    MCH 33.2*  --    MCHC 36.1*  --    RDW 13.6  --    *  --    MPV 10.1  --        CHEMISTRY LAST (LAST 24 HOURS)  Recent Labs   Lab 07/29/25  0242 07/29/25  0326 07/29/25  0744 07/29/25  0859     --  143  --    K 4.4  --  5.4*  --    CL 92*  --  90*  --    CO2 28  --  26  --    ANIONGAP 23*  --  27*  --    BUN 34*  --  35*  --    CREATININE 4.7*  --  5.0*  --    GLU 75  --  100  --    CALCIUM 6.4*  --  6.0*  --    PH  --    < >  --  7.098*   ALBUMIN 2.4*  --   --   --    PROT 3.5*  --   --   --    ALKPHOS 123  --   --   --    ALT 3,175*  --   --   --    AST 8,729*  --   --   --    BILITOT 3.6*  --   --   --     < > = values in this interval not displayed.       COAGULATION LAST (LAST 24 HOURS)  No results for input(s): "LABPT", "INR", "APTT" in the last 24 hours.      CARDIAC PROFILE (LAST 24 HOURS)  Recent Labs   Lab 07/27/25  2143 07/28/25  1621   *  --    CPK  --  2,596*       LAST 7 DAYS MICROBIOLOGY   Microbiology Results (last 7 days)       Procedure Component Value Units Date/Time    Culture, Respiratory with " Gram Stain [[id number]] Collected: 07/28/25 0411    Order Status: Completed Specimen: Respiratory from Sputum Updated: 07/29/25 0810     Respiratory Culture Normal respiratory zan     GRAM STAIN (RESPIRATORY) <10 Epithelial Cells/LPF      Few WBC seen      Many Gram positive cocci      Few Gram Negative Rods      Rare Yeast            MOST RECENT IMAGING  X-Ray Chest AP Portable  Narrative: EXAMINATION:  XR CHEST AP PORTABLE    CLINICAL HISTORY:  intuabted;    FINDINGS:  Portable chest at 03:44 is compared to 07/28/2025 shows normal cardiomediastinal silhouette. There is hypoinflation.  The endotracheal tube, NG tube and right IJ catheter in stable position.    There is minimal atelectasis in the lung bases.  There are no confluent infiltrates or pleural effusions.  There is no pneumothorax.  Pulmonary vasculature is normal. No acute osseous abnormality.  Impression: Hypoinflation with atelectasis within the lung bases.    Support devices in satisfactory position    Electronically signed by: Terese Lemus  Date:    07/29/2025  Time:    07:35      CURRENT VISIT EKG  Results for orders placed or performed during the hospital encounter of 07/27/25   EKG 12-lead   Result Value Ref Range    QRS Duration 84 ms    [facility] QTC Calculation 434 ms    Narrative    Test Reason : I46.9,    Vent. Rate : 103 BPM     Atrial Rate : 103 BPM     P-R Int : 120 ms          QRS Dur :  84 ms      QT Int : 332 ms       P-R-T Axes :  63 109  70 degrees    QTcB Int : 434 ms    Sinus tachycardia  Rightward axis  Borderline Abnormal ECG  No previous ECGs available  Confirmed by Liban Blue ([id number]) on 7/28/2025 11:38:15 AM    Referred By: AAAREFERRAL SELF           Confirmed By: Liban Blue       ECHOCARDIOGRAM RESULTS  No results found for this or any previous visit.        VENTILATOR INFORMATION  Vent Mode: A/C  Oxygen Concentration (%):  [40-85] 85  Resp Rate Total:  [14 br/min-28 br/min] 28 br/min  Vt Set:  [450 mL] 450 mL  PEEP/CPAP:   [5 cmH20-8 cmH20] 8 cmH20  Pressure Support:  [10 cmH20] 10 cmH20  Mean Airway Pressure:  [9.2 itK88-01 cmH20] 21 cmH20           LAST ARTERIAL BLOOD GAS  ABG  Recent Labs   Lab 07/29/25  0326 07/29/25  0859   PH 7.230* 7.098*   PO2 64* 61.6*   PCO2 44.8 38.9   HCO3 18.7* 12.0   BE -9*  --        ASSESSMENT:   Cardiac arrest- initial proceeding rhythm thought to be VFib  Respiratory failure  Shock- hypovolemic and distributive, possible additional features of septic  Acute pancreatitis  Alcoholic hepatitis  Anion gap metabolic acidosis  Acute renal failure  Acute GI bleed      Presents with cardiac arrest.  Does not appear to be related to an acute coronary event.  Current differential is acute pancreatitis, electrolyte disturbance with arrhythmia, acute GI bleed, acute intoxication from alcohol, or some combination of these differentials.  Aspiration seems to be less likely at this time as I do not see evidence of pneumonitis on his CT. CT abdomen shows no evidence of acute bleeding.   CT head is concerning for potential anoxic brain injury.    Patient has continued to decline.  He had another cardiac arrest late last night.  I am not clear on the etiology of the arrest.  Liver enzymes are currently in the thousands.  Primary concern is potentially development of brain herniation due to the enzymes being in the 1010 thousands and sudden cardiac arrest without really any known precipitating event.  Other considerations include arrhythmia, acute coronary event.  We were not able to send the patient down to the CT scanner due to instability and requirement of extensive vasopressors, and we were concerned that he would have another arrest if we move the patient.      PLAN:   Neuro  - continue current sedation  - concern for potential brain herniation, not able to obtain CT head at this current time due to severe instability    Respiratory:  - continue current vent settings,    Cardiac  - continue map goal 65, c  continue use of vasopressors    Renal  - trend urine output, aggressive fluid resuscitation, monitor urine output, continue Neil      FENGI  - replace electrolytes as needed  - early tube feeding is typically indicated in cases of acute pancreatitis, we will consult nutrition and likely start tomorrow morning  - continue NG tube to suction   - appropriate access, he has at least 2 18 guages   - concern for developing acute liver failure, trend liver enzymes and ] INR    Heme  - holding heparin ppx due to oozing GI blled  - GI Consultation if we are able to stabilize   - 40mg PPI increased to BID, continue octreotide,   - montior CBC- q6h for today     Estiven Mar MD  Date of Service: 07/29/2025  11:45 AM      Upon my evaluation, this patient had a high probability of imminent or life-threatening deterioration, which required my direct attention, intervention, and personal management.    I have personally provided 39 minutes of critical care time exclusive of time spent on separately billable procedures. Over 50% of the time of this encounter was spent in direct care at the bedside. Time includes review of laboratory data, radiology results, discussion with consultants, and monitoring for potential decompensation. Interventions were performed as documented above.           [1]   Social History  Tobacco Use   Smoking Status Not on file   Smokeless Tobacco Not on file

## 2025-07-29 NOTE — CHAPLAIN
I spoke with Father Alan at [facility] at 10:00, he will come and anoint patient and administer Sacrament of the Sick ('last rites') very soon.       José Rider, Spiritual Care  [phone]

## 2025-07-29 NOTE — PLAN OF CARE
25 1623   Final Note   Assessment Type Final Discharge Note   Anticipated Discharge Disposition

## 2025-07-29 NOTE — PLAN OF CARE
Problem: Adult Inpatient Plan of Care  Goal: Plan of Care Review  Outcome: Progressing  Goal: Absence of Hospital-Acquired Illness or Injury  Outcome: Progressing  Goal: Optimal Comfort and Wellbeing  Outcome: Progressing     Problem: Fall Injury Risk  Goal: Absence of Fall and Fall-Related Injury  Outcome: Progressing     Problem: Wound  Goal: Optimal Pain Control and Function  Outcome: Progressing  Goal: Skin Health and Integrity  Outcome: Progressing  Goal: Optimal Wound Healing  Outcome: Progressing     Problem: Skin Injury Risk Increased  Goal: Skin Health and Integrity  Outcome: Progressing     Problem: Neurologic Impairment  Goal: Effective Bowel Elimination/Continence  Outcome: Progressing  Goal: Effective Spasticity Management  Outcome: Progressing     Problem: Wound  Goal: Improved Oral Intake  Outcome: Unable to Meet     Problem: Neurologic Impairment  Goal: Optimal Adjustment to Neurologic Impair  Outcome: Unable to Meet  Goal: Effective Communication Skills  Outcome: Unable to Meet  Goal: Optimal Mobility [place] and Safety  Outcome: Unable to Meet  Goal: Optimal Movement and Motor Control  Outcome: Unable to Meet  Goal: Optimal Eating/Swallowing without Aspiration  Outcome: Unable to Meet     Problem: Restraint, Nonviolent  Goal: Absence of Harm or Injury  Outcome: Met

## 2025-07-29 NOTE — PROGRESS NOTES
Attempted to call pt's Mother [phone] [place]. No answer, left message. PC Team will continue to follow.

## 2025-07-29 NOTE — DISCHARGE SUMMARY
[facility] Medicine  Discharge Summary      Patient Name: Huber Burciaga  MRN: [id number]  CARMENCITA: [id number]  Patient Class: IP- Inpatient  Admission Date: 7/27/2025  Hospital Length of Stay: 2 days  Discharge Date and Time:    Attending Physician: Phyllis Taylor MD   Discharging Provider: Phyllis Taylor MD  Primary Care Provider: No primary care provider on file.    Primary Care Team: Networked reference to record PCT     HPI:   Patient is a 49-year-old male with history of alcohol use presents to the emergency room for cardiac arrest.  Patient intubated and sedated on examination, history supplemented by ED provider.  Patient reportedly has been drinking heavily for the last 3-4 days, apparently this happens whenever patient's son returns back to his ex-wife.  Reportedly family member was outside for about 15 minutes and came back in and found the patient lying on the kitchen floor, unresponsive.  Per patient's cousin at bedside states a family member started CPR and called 911.  Upon arrival by EMS patient was pulseless, was given epi, had shockable rhythm and was shocked once.  ROSC achieved after about 20 minutes.  Patient again then had another cardiac arrest in the emergency room and ROCS was achieved shortly after.  Labs showed anion gap 19, glucose 218, Mag 2.9, elevated liver enzymes, elevated BNP and troponin, no ST elevations on EKG.  Initial ABG showed respiratory and metabolic acidosis.  Patient had blood-tinged output from OG tube.  Chest x-ray showing findings of possible pneumonia.  CT head, cervical spine, abdomen and pelvis and chest pending.    * No surgery found *      Hospital Course:   Patient is a 49-year-old male with history of alcohol use presents to the emergency room for cardiac arrest. Patient intubated and sedated on examination, history supplemented by ED provider. Patient reportedly has been drinking heavily for the last 3-4 days, apparently this  "happens whenever patient's son returns back to his ex-wife. Reportedly family member was outside for about 15 minutes and came back in and found the patient lying on the kitchen floor, unresponsive. Per patient's cousin at bedside states a family member started CPR and called 911. Upon arrival by EMS patient was pulseless, was given epi, had shockable rhythm and was shocked once. ROSC achieved after about 20 minutes. Patient again then had another cardiac arrest in the emergency room and ROCS was achieved shortly after. Labs showed anion gap 19, glucose 218, Mag 2.9, elevated liver enzymes, elevated BNP and troponin, no ST elevations on EKG. Initial ABG showed respiratory and metabolic acidosis. Patient had blood-tinged output from OG tube. Chest x-ray showing findings of possible pneumonia. CT head, cervical spine, abdomen and pelvis and chest pending."     He is currently admitted to the ICU after cardiac arrest.  Initial workup has revealed what appears to be a hypoxic brain injury.  Unfortunately course has been complicated by multiorgan failure; shock on multiple vasopressor support, respiratory failure on invasive mechanical ventilation, renal failure, and acute ischemic hepatitis.  He is critically ill with a guarded and potentially very poor prognosis.      He unfortunately had another PEA arrest last night. Per Dr. Erika Olsen code note:  Patient did code (PEA) for approximately 3 minutes and underwent resuscitation with successful ROSC via ACLS protocol.  He did get an amp of bicarbonate.    Family gathered this morning and were updated on last night's events and today's worsening labs.  Lactic acid 25.4.  They are in agreement that we will not escalate care. DNR in place.     Called to bedside with asystole on monitor. On evaluation, he had no palpable pulse, no audible heart tones, pupils fixed dilated and nonreactive. Death pronounced at 12:08pm.  Family at bedside and condolences given.      Goals of " Care Treatment Preferences:  Code Status: DNR         Consults:   Consults (From admission, onward)          Status Ordering Provider     Inpatient Consult to Neurology Services (General Neurology)  Once        Provider:  Dakotah Guerra MD    Completed TIFFANY CASTANEDA     Inpatient consult to Nephrology  Once        Provider:  Xenia Up MD    Completed HAMDESHAWN, WADIA     Inpatient consult to Social Work/Case Management  Once        Provider:  (Not yet assigned)    Acknowledged HAMED, WADIA     Inpatient consult to Palliative Care  Once        Provider:  Miranda Rodriguez NP    Completed HAMED, WADIA     Inpatient consult to Cardiology  Once        Provider:  Tali Herron MD    Completed HAMED, WADIA     Inpatient consult to Anesthesiology  Once        Provider:  Alan Richard MD    Acknowledged HAMED, WADIA     Inpatient consult to Pulmonology  Once        Provider:  Miranda Miller MD    Completed HAMED, WADIA            Assessment & Plan  Cardiac arrest  Concern for possible aspiration causing hypoxia leading to cardiac arrest   Reportedly had episode of vfib s/p DCCV with EMS  Follow up echocardiogram   CT head showing findings of hypoxic brain injury, overall prognosis poor   Continue Levophed, wean as tolerated   Continue mechanical ventilation, pulmonology consulted  Cardio consulted     Patient's cousin Ameya is contact   Aspiration pneumonia  Possible aspiration pneumonia seen on chest x-ray  Continue with empiric antibiotics   Follow up sputum cultures  Follow up CTA chest  Follow up procalcitonin    Acute respiratory failure with hypoxia and hypercarbia  Patient with Hypercapnic and Hypoxic Respiratory failure which is Acute.  he is not on home oxygen. Supplemental oxygen was provided and noted- Vent Mode: A/C  Oxygen Concentration (%):  [40-85] 85  Resp Rate Total:  [14 br/min-28 br/min] 28 br/min  Vt Set:  [450 mL] 450 mL  PEEP/CPAP:  [5 cmH20-8 cmH20] 8 cmH20  Pressure  Support:  [10 cmH20] 10 cmH20  Mean Airway Pressure:  [9.2 slO17-99 cmH20] 21 cmH20    .   Signs/symptoms of respiratory failure include- increased work of breathing and respiratory distress. Contributing diagnoses includes - Aspiration Labs and images were reviewed. Patient Has recent ABG, which has been reviewed. Will treat underlying causes and adjust management of respiratory failure as follows- continue mechanical ventilation   Pulmonology consulted   Repeat ABG   Repeat chest x-ray in the AM    Metabolic acidosis, increased anion gap  Likely secondary to elevated lactic acid  Started on bicarb drip  Trend lactic acid    Transaminitis  Likely liver injury secondary to shock  Repeat CMP    Ventricular fibrillation  Reportedly had ventricular fibrillation in the field, status post cardioversion   We will start patient on amiodarone drip, cardiology consulted  Alcohol use  Binge drinking in the last several days  Alcohol level 243 on admission    Elevated troponin  secondary to cardiac arrest   Trend troponin   Cardiology consulted    Shock  Cardiogenic shock  Cont levo  F/u echo  Anoxic brain injury  Seen on CTH  Poor prognosis     Goals of care, counseling/discussion  Advance Care Planning   DNR   Morphine ordered for comfort       Final Active Diagnoses:    Diagnosis Date Noted POA    PRINCIPAL PROBLEM:  Cardiac arrest [I46.9] 07/27/2025 Yes    Metabolic acidosis, increased anion gap [E87.29] 07/28/2025 Yes    Transaminitis [R74.01] 07/28/2025 Yes    Ventricular fibrillation [I49.01] 07/28/2025 Yes    Alcohol use [F10.90] 07/28/2025 Yes    Elevated troponin [R79.89] 07/28/2025 Yes    Shock [R57.9] 07/28/2025 Yes    Anoxic brain injury [G93.1] 07/28/2025 Yes    Goals of care, counseling/discussion [Z71.89] 07/28/2025 Not Applicable    Aspiration pneumonia [J69.0] 07/27/2025 Yes    Acute respiratory failure with hypoxia and hypercarbia [J96.01, J96.02] 07/27/2025 Yes      Problems Resolved During this Admission:        Discharged Condition:     Disposition:     Follow Up:    Patient Instructions:   No discharge procedures on file.    Significant Diagnostic Studies: N/A    Pending Diagnostic Studies:       Procedure Component Value Units Date/Time    HCV Virus Hold Specimen [[id number]] Collected: 25    Order Status: Sent Lab Status: In process Updated: 25    Specimen: Blood     HIV Virus Confirmation Hold Specimen [[id number]] Collected: 25    Order Status: Sent Lab Status: In process Updated: 25    Specimen: Blood            Medications:  None    Indwelling Lines/Drains at time of discharge:   Lines/Drains/Airways       Central Venous Catheter Line  Duration             Percutaneous Central Line Triple Lumen 25 0126 Internal Jugular Right 1 day              Drain  Duration                  NG/OG Tube 25 [place] sump 18 Fr. Left mouth 1 day         Rectal Tube 25  1 day         Urethral Catheter 257 Coude 16 Fr. 1 day              Airway  Duration                  Airway - Non-Surgical 25 2 days                        Time spent on the discharge of patient: 35 minutes    Critical care time spent on the evaluation and treatment of severe organ dysfunction, review of pertinent labs and imaging studies, discussions with consulting providers and discussions with patient/family: 60 minutes.     Phyllis Taylor MD  Department of Hospital Medicine  [facility]

## 2025-07-29 NOTE — CHAPLAIN
Visit  José Rider, System-wide Leader  Spiritual Care Dept.      Patient: Huber Burciaga  MRN: [id number]  : 1975  Age: 49 y.o.  Legal sex: male   Hospital Length of Stay: 1 days  Code Status: Full Code   Attending Provider: Phyllis Taylor MD  Principal Problem: Cardiac arrest  Patient's Episcopalian: No Episcopalian    Patient does not have any adult children    What prompted this initial visit?: Family/friend requested a visit and Care team member(s) requested a visit    Who was present during my visit: Patient and family and Care team member(s). Pt's ex-wife, Aleshia; pt's son James (14); pt's step-son Albaro (25; NOT legally adopted by pt); pt's aunt Shilpa who raise him; numerous friends in and out    Feelings (Emotions/Fears/Experiences/Coping): After consent for my visit was granted, rapport was quickly established with all present. Several family members were crying throughout my visit. Patient's minor children (James 14; Awilda 17) live with pt's ex-wife Aleshia (sp?). Awilda is enroute from a vacation in the [place]. Ex-wife and others state that pt is somewhat estranged from his mother, Kamilah, who was here earlier today. Since patient is  and has NO adult children, pt's mother Kamilah would be the legal NOK. The hospitalist on duty tonight talked with me and with pt's aunt Shilpa and was going to call the mother to see if she wanted to let Shilpa be a surrogate decision maker. Mother did not answer the phone when doctor called around 21:05. Family told me pt was not a practicing Sikh but was raised Sikh. They requested that I attempt to call a  to anoint and administer sacrament of the sick. Family and friends that I met with are understandably broken-hearted but are coping appropriately with patient's illness, hospitalization, and poor prognosis. They were all observed being very supportive of one another. No one was observed to be in extraordinary  emotional or spiritual distress while I was with them. Hospitalist was going to have a GOC conversation with whoever was determined to be the surrogate decision maker to hear and document their wishes after being counseled on pt's progress.        Family/Friends (Support, Community, Culture): Pt enjoys a large support system of family and friends who are local to him.       Mariana (Beliefs/Meaning/Philosophy): Yazdanism.  will attempt to have a  visit as soon as possible to anoint and administer sacrament of the sick (last rites). Family requested prayer for the patient and family which was offered before leaving bedside.     Future (Spiritual Care Plan of Action): Chaplains remain available to provide support for this patient and family. I educated the patient's family/friend(s) regarding the services offered by the Spiritual Care team  Besides the request for prayer, no needs were expressed during this visit. I will be back on campus before 0800 and will check in on any family at bedside.     To [facility] Staff: Don't hesitate to call a  if your patient or their family needs support. Chaplains will also gladly visit with patients and families to offer support during GOC meetings with physicians/JENELLE; during transition to comfort care; to help complete Advance Directive paperwork; etc. To reach me, please call my office number below which forwards to my cell phone.       José Rider M.Div.  System-wide Leader  Spiritual Care Department  [facility]    Office: [phone]  [facility] SpectraLink: [phone] (Horacio Bullard)  Department: [phone]  Email: [email or web address]      The care I provide is shaped by this Code of Ethics.

## 2025-07-29 NOTE — PROGRESS NOTES
"Pharmacist Renal Dose Adjustment Note    Huber Burciaga is a 49 y.o. male being treated with the medication Piperacillin-tazobactam    Ht: 5' 8" (1.727 m)  Wt: 67.7 kg (149 lb 4 oz)  Estimated Creatinine Clearance: 18.2 mL/min (A) (based on SCr of 4.7 mg/dL (H)).  Body mass index is 22.69 kg/m².    Per [facility] renal dosing protocol:     Previous Order: Piperacillin-tazobactam 4.5 g Q 8 hours    Will be changed to:     New Order: Piperacillin-tazobactam 4.5 g Q 12 hours,    Due to: crcl < 20 ml/min    Renal dose adjustments performed as noted above.    We will continue monitoring and adjusting as necessary.    Pharmacist: Alireza Fam, PharmD  Ext: [phone]      "

## 2025-07-29 NOTE — NURSING
"1158 Patient started having pauses in heart beats and went asystole at 1201. Patient is a DNR at this time. Dr. Taylor notified to come to bedside. Family at bedside and aware.   - Patient  was pronounced dead by Dr. Phyllis Taylor at 1208 on 2025.  [facility] representative, Salima Carr -  notified of patients death. [facility] referral number [phone] . Salima Carr, [facility] rep states due to patient medical history, he is only a candidate for eye donation. No need to hold body in room per [facility] rep, Salima Carr.   Angela with [facility] called and said it was okay to release the body to [facility]. No patient hold in room is needed per , [facility] Angela barillas. [facility] will come to [facility] for eye donation prior to patient being picked up by  home staff.  [facility]  office notified by phone call regarding patients death. Huber Lucero [facility]  .  Case number: [id number]. Huber Lucero at [facility]  office released body date at time 25 at 1507.  [facility] in [place], [place] was notified of patients death. Petr at [facility] is aware and states "We will  patient from the [facility]."   Home phone number is [phone] .   - Patient body cleaned and all lines/tubes/catheter removed. Patient identifiable toe tag placed. Tag also placed on body bag zipper. Angela HAMILTON RN took patient's body to [facility] in body bag supine. Face sheet and post mortem paper from post mortem tab flowsheet printed and sent with patient and given to nursing supervisor, Tanja. Patient belongings had no belongings in room for family to take.   "

## 2025-07-29 NOTE — ASSESSMENT & PLAN NOTE
Patient with Hypercapnic and Hypoxic Respiratory failure which is Acute.  he is not on home oxygen. Supplemental oxygen was provided and noted- Vent Mode: A/C  Oxygen Concentration (%):  [40-85] 85  Resp Rate Total:  [14 br/min-28 br/min] 28 br/min  Vt Set:  [450 mL] 450 mL  PEEP/CPAP:  [5 cmH20-8 cmH20] 8 cmH20  Pressure Support:  [10 cmH20] 10 cmH20  Mean Airway Pressure:  [9.2 wbY43-50 cmH20] 21 cmH20    .   Signs/symptoms of respiratory failure include- increased work of breathing and respiratory distress. Contributing diagnoses includes - Aspiration Labs and images were reviewed. Patient Has recent ABG, which has been reviewed. Will treat underlying causes and adjust management of respiratory failure as follows- continue mechanical ventilation   Pulmonology consulted   Repeat ABG   Repeat chest x-ray in the AM

## 2025-07-30 LAB
BACTERIA SPEC CULT: NORMAL
GRAM STAIN (RESPIRATORY) (SMH): NORMAL